# Patient Record
Sex: FEMALE | Race: WHITE | Employment: FULL TIME | URBAN - METROPOLITAN AREA
[De-identification: names, ages, dates, MRNs, and addresses within clinical notes are randomized per-mention and may not be internally consistent; named-entity substitution may affect disease eponyms.]

---

## 2018-01-29 ENCOUNTER — APPOINTMENT (OUTPATIENT)
Dept: GENERAL RADIOLOGY | Age: 43
DRG: 292 | End: 2018-01-29

## 2018-01-29 ENCOUNTER — HOSPITAL ENCOUNTER (INPATIENT)
Age: 43
LOS: 3 days | Discharge: HOME | DRG: 292 | End: 2018-02-01
Attending: EMERGENCY MEDICINE | Admitting: INTERNAL MEDICINE
Payer: MEDICAID

## 2018-01-29 DIAGNOSIS — R06.00 DYSPNEA, UNSPECIFIED TYPE: ICD-10-CM

## 2018-01-29 DIAGNOSIS — I50.31 ACUTE DIASTOLIC HEART FAILURE (HCC): ICD-10-CM

## 2018-01-29 DIAGNOSIS — A59.9 INFECTION DUE TO TRICHOMONAS: ICD-10-CM

## 2018-01-29 DIAGNOSIS — I50.20 SYSTOLIC CONGESTIVE HEART FAILURE, UNSPECIFIED CONGESTIVE HEART FAILURE CHRONICITY: Primary | ICD-10-CM

## 2018-01-29 PROBLEM — I10 ESSENTIAL HYPERTENSION: Status: ACTIVE | Noted: 2018-01-29

## 2018-01-29 PROBLEM — I50.9 ACUTE HEART FAILURE (HCC): Status: ACTIVE | Noted: 2018-01-29

## 2018-01-29 PROBLEM — A59.01 TRICHOMONAS VAGINALIS (TV) INFECTION: Status: ACTIVE | Noted: 2018-01-29

## 2018-01-29 PROBLEM — N39.0 UTI (URINARY TRACT INFECTION): Status: ACTIVE | Noted: 2018-01-29

## 2018-01-29 LAB
ALBUMIN SERPL-MCNC: 4 G/DL (ref 3.9–4.9)
ALP BLD-CCNC: 97 U/L (ref 40–130)
ALT SERPL-CCNC: 31 U/L (ref 0–33)
ANION GAP SERPL CALCULATED.3IONS-SCNC: 13 MEQ/L (ref 7–13)
AST SERPL-CCNC: 46 U/L (ref 0–35)
BACTERIA: ABNORMAL /HPF
BASOPHILS ABSOLUTE: 0.1 K/UL (ref 0–0.2)
BASOPHILS RELATIVE PERCENT: 1.3 %
BILIRUB SERPL-MCNC: 0.5 MG/DL (ref 0–1.2)
BILIRUBIN URINE: NEGATIVE
BLOOD, URINE: ABNORMAL
BUN BLDV-MCNC: 11 MG/DL (ref 6–20)
CALCIUM SERPL-MCNC: 9 MG/DL (ref 8.6–10.2)
CHLORIDE BLD-SCNC: 101 MEQ/L (ref 98–107)
CLARITY: ABNORMAL
CO2: 23 MEQ/L (ref 22–29)
COLOR: YELLOW
CREAT SERPL-MCNC: 0.92 MG/DL (ref 0.5–0.9)
EKG ATRIAL RATE: 107 BPM
EKG P AXIS: 73 DEGREES
EKG P-R INTERVAL: 136 MS
EKG Q-T INTERVAL: 350 MS
EKG QRS DURATION: 74 MS
EKG QTC CALCULATION (BAZETT): 467 MS
EKG R AXIS: 41 DEGREES
EKG T AXIS: 0 DEGREES
EKG VENTRICULAR RATE: 107 BPM
EOSINOPHILS ABSOLUTE: 0.1 K/UL (ref 0–0.7)
EOSINOPHILS RELATIVE PERCENT: 0.9 %
GFR AFRICAN AMERICAN: >60
GFR NON-AFRICAN AMERICAN: >60
GLOBULIN: 2.6 G/DL (ref 2.3–3.5)
GLUCOSE BLD-MCNC: 126 MG/DL (ref 74–109)
GLUCOSE URINE: NEGATIVE MG/DL
HCG QUALITATIVE: NEGATIVE
HCT VFR BLD CALC: 44 % (ref 37–47)
HEMOGLOBIN: 13.9 G/DL (ref 12–16)
KETONES, URINE: NEGATIVE MG/DL
LEUKOCYTE ESTERASE, URINE: ABNORMAL
LYMPHOCYTES ABSOLUTE: 1 K/UL (ref 1–4.8)
LYMPHOCYTES RELATIVE PERCENT: 15.3 %
MAGNESIUM: 2 MG/DL (ref 1.7–2.3)
MCH RBC QN AUTO: 28.2 PG (ref 27–31.3)
MCHC RBC AUTO-ENTMCNC: 31.6 % (ref 33–37)
MCV RBC AUTO: 89.2 FL (ref 82–100)
MONOCYTES ABSOLUTE: 0.6 K/UL (ref 0.2–0.8)
MONOCYTES RELATIVE PERCENT: 9.5 %
NEUTROPHILS ABSOLUTE: 4.9 K/UL (ref 1.4–6.5)
NEUTROPHILS RELATIVE PERCENT: 73 %
NITRITE, URINE: POSITIVE
PDW BLD-RTO: 15.5 % (ref 11.5–14.5)
PH UA: 7.5 (ref 5–9)
PLATELET # BLD: 270 K/UL (ref 130–400)
POTASSIUM SERPL-SCNC: 4.4 MEQ/L (ref 3.5–5.1)
PRO-BNP: 1388 PG/ML
PROTEIN UA: ABNORMAL MG/DL
RBC # BLD: 4.93 M/UL (ref 4.2–5.4)
RBC UA: ABNORMAL /HPF (ref 0–2)
SODIUM BLD-SCNC: 137 MEQ/L (ref 132–144)
SPECIFIC GRAVITY UA: 1.01 (ref 1–1.03)
TOTAL PROTEIN: 6.6 G/DL (ref 6.4–8.1)
TRICHOMONAS: PRESENT /HPF
TROPONIN: <0.01 NG/ML (ref 0–0.01)
TSH SERPL DL<=0.05 MIU/L-ACNC: 1.95 UIU/ML (ref 0.27–4.2)
TSH SERPL DL<=0.05 MIU/L-ACNC: 2.36 UIU/ML (ref 0.27–4.2)
URINE REFLEX TO CULTURE: YES
UROBILINOGEN, URINE: 0.2 E.U./DL
WBC # BLD: 6.7 K/UL (ref 4.8–10.8)
WBC UA: ABNORMAL /HPF (ref 0–5)

## 2018-01-29 PROCEDURE — 84484 ASSAY OF TROPONIN QUANT: CPT

## 2018-01-29 PROCEDURE — 36415 COLL VENOUS BLD VENIPUNCTURE: CPT

## 2018-01-29 PROCEDURE — 93005 ELECTROCARDIOGRAM TRACING: CPT

## 2018-01-29 PROCEDURE — 83735 ASSAY OF MAGNESIUM: CPT

## 2018-01-29 PROCEDURE — 2060000000 HC ICU INTERMEDIATE R&B

## 2018-01-29 PROCEDURE — 71046 X-RAY EXAM CHEST 2 VIEWS: CPT

## 2018-01-29 PROCEDURE — 84443 ASSAY THYROID STIM HORMONE: CPT

## 2018-01-29 PROCEDURE — 87077 CULTURE AEROBIC IDENTIFY: CPT

## 2018-01-29 PROCEDURE — 6360000002 HC RX W HCPCS: Performed by: EMERGENCY MEDICINE

## 2018-01-29 PROCEDURE — 99285 EMERGENCY DEPT VISIT HI MDM: CPT

## 2018-01-29 PROCEDURE — 6360000002 HC RX W HCPCS: Performed by: INTERNAL MEDICINE

## 2018-01-29 PROCEDURE — 96374 THER/PROPH/DIAG INJ IV PUSH: CPT

## 2018-01-29 PROCEDURE — 83880 ASSAY OF NATRIURETIC PEPTIDE: CPT

## 2018-01-29 PROCEDURE — 6370000000 HC RX 637 (ALT 250 FOR IP): Performed by: EMERGENCY MEDICINE

## 2018-01-29 PROCEDURE — 80053 COMPREHEN METABOLIC PANEL: CPT

## 2018-01-29 PROCEDURE — 6370000000 HC RX 637 (ALT 250 FOR IP): Performed by: INTERNAL MEDICINE

## 2018-01-29 PROCEDURE — 87186 SC STD MICRODIL/AGAR DIL: CPT

## 2018-01-29 PROCEDURE — 85025 COMPLETE CBC W/AUTO DIFF WBC: CPT

## 2018-01-29 PROCEDURE — 81001 URINALYSIS AUTO W/SCOPE: CPT

## 2018-01-29 PROCEDURE — 2580000003 HC RX 258: Performed by: EMERGENCY MEDICINE

## 2018-01-29 PROCEDURE — 87086 URINE CULTURE/COLONY COUNT: CPT

## 2018-01-29 PROCEDURE — 84703 CHORIONIC GONADOTROPIN ASSAY: CPT

## 2018-01-29 RX ORDER — SODIUM CHLORIDE 0.9 % (FLUSH) 0.9 %
10 SYRINGE (ML) INJECTION EVERY 12 HOURS SCHEDULED
Status: DISCONTINUED | OUTPATIENT
Start: 2018-01-29 | End: 2018-02-01 | Stop reason: HOSPADM

## 2018-01-29 RX ORDER — CARVEDILOL 3.12 MG/1
3.12 TABLET ORAL 2 TIMES DAILY WITH MEALS
Status: DISCONTINUED | OUTPATIENT
Start: 2018-01-29 | End: 2018-01-30

## 2018-01-29 RX ORDER — METRONIDAZOLE 500 MG/1
2000 TABLET ORAL ONCE
Status: COMPLETED | OUTPATIENT
Start: 2018-01-29 | End: 2018-01-29

## 2018-01-29 RX ORDER — FUROSEMIDE 10 MG/ML
80 INJECTION INTRAMUSCULAR; INTRAVENOUS ONCE
Status: COMPLETED | OUTPATIENT
Start: 2018-01-29 | End: 2018-01-29

## 2018-01-29 RX ORDER — ACETAMINOPHEN 325 MG/1
650 TABLET ORAL EVERY 4 HOURS PRN
Status: DISCONTINUED | OUTPATIENT
Start: 2018-01-29 | End: 2018-02-01 | Stop reason: HOSPADM

## 2018-01-29 RX ORDER — NICOTINE 21 MG/24HR
1 PATCH, TRANSDERMAL 24 HOURS TRANSDERMAL DAILY
Status: DISCONTINUED | OUTPATIENT
Start: 2018-01-29 | End: 2018-02-01 | Stop reason: HOSPADM

## 2018-01-29 RX ORDER — M-VIT,TX,IRON,MINS/CALC/FOLIC 27MG-0.4MG
1 TABLET ORAL DAILY
COMMUNITY
End: 2021-02-24

## 2018-01-29 RX ORDER — LISINOPRIL 20 MG/1
20 TABLET ORAL DAILY
Status: DISCONTINUED | OUTPATIENT
Start: 2018-01-29 | End: 2018-02-01 | Stop reason: HOSPADM

## 2018-01-29 RX ORDER — SODIUM CHLORIDE 0.9 % (FLUSH) 0.9 %
10 SYRINGE (ML) INJECTION PRN
Status: DISCONTINUED | OUTPATIENT
Start: 2018-01-29 | End: 2018-02-01 | Stop reason: HOSPADM

## 2018-01-29 RX ORDER — ONDANSETRON 2 MG/ML
4 INJECTION INTRAMUSCULAR; INTRAVENOUS EVERY 6 HOURS PRN
Status: DISCONTINUED | OUTPATIENT
Start: 2018-01-29 | End: 2018-02-01 | Stop reason: HOSPADM

## 2018-01-29 RX ADMIN — FUROSEMIDE 80 MG: 10 INJECTION, SOLUTION INTRAMUSCULAR; INTRAVENOUS at 11:30

## 2018-01-29 RX ADMIN — ENOXAPARIN SODIUM 40 MG: 40 INJECTION SUBCUTANEOUS at 17:50

## 2018-01-29 RX ADMIN — FUROSEMIDE 1 MG/HR: 10 INJECTION, SOLUTION INTRAVENOUS at 13:15

## 2018-01-29 RX ADMIN — METRONIDAZOLE 2000 MG: 500 TABLET ORAL at 16:28

## 2018-01-29 RX ADMIN — LISINOPRIL 20 MG: 20 TABLET ORAL at 17:50

## 2018-01-29 RX ADMIN — CARVEDILOL 3.12 MG: 3.12 TABLET, FILM COATED ORAL at 17:50

## 2018-01-29 RX ADMIN — ACETAMINOPHEN 650 MG: 325 TABLET, FILM COATED ORAL at 23:18

## 2018-01-29 RX ADMIN — METRONIDAZOLE 2000 MG: 500 TABLET ORAL at 13:25

## 2018-01-29 ASSESSMENT — PAIN SCALES - GENERAL
PAINLEVEL_OUTOF10: 6
PAINLEVEL_OUTOF10: 0
PAINLEVEL_OUTOF10: 6

## 2018-01-29 ASSESSMENT — ENCOUNTER SYMPTOMS
ABDOMINAL PAIN: 0
SHORTNESS OF BREATH: 1
CHEST TIGHTNESS: 0
ABDOMINAL DISTENTION: 1
COUGH: 0
VOMITING: 0
NAUSEA: 0
SORE THROAT: 0
EYE PAIN: 0

## 2018-01-29 ASSESSMENT — PAIN DESCRIPTION - ORIENTATION: ORIENTATION: LEFT;RIGHT

## 2018-01-29 ASSESSMENT — PAIN DESCRIPTION - LOCATION: LOCATION: LEG

## 2018-01-29 ASSESSMENT — PAIN DESCRIPTION - DESCRIPTORS: DESCRIPTORS: ACHING

## 2018-01-29 ASSESSMENT — PAIN DESCRIPTION - PAIN TYPE: TYPE: ACUTE PAIN

## 2018-01-29 ASSESSMENT — PAIN DESCRIPTION - FREQUENCY: FREQUENCY: CONTINUOUS

## 2018-01-29 NOTE — H&P
Department of Internal Medicine   History and Physical      CHIEF COMPLAINT:  Shortness of breath    Reason for Admission:  Acute heart failure    History Obtained From:  patient    HISTORY OF PRESENT ILLNESS:      The patient is a 43 y.o. female with no significant past medical history of who presents with shortness of breath and lower extremity swelling that started 1 month ago. As per the patient she was able to perform all ehr ADLs without getting short of breath. Lately she has been complaining about orthopnea and paroxysmal dyspnea. She has also gained significant amount of weight in past month and has been feeling abdominal fullness. As per the patient she was diagnosed with post partum cardiomyopathy 8 years ago after her second child was born. She was seen by cardiologist and was on few cardiac medications. She was then asked to discontinue them as she was doing fine. She was also diagnosed with gestational diabetes mellitus during both of her pregnancies. She has positive family history of CAD in her father and he  of MI at age 52  She is a current smoker and drinks alcohol on regular basis  She also has history of cocaine use few weeks ago    She denies any chest pain, shortness of breath, abdominal pain, nausea, vomiting, diarrhea, headache, dizziness or lightheadedness. Past Medical History:        Diagnosis Date    CHF (congestive heart failure) ([facility] [address]      Past Surgical History:    History reviewed. No pertinent surgical history. Immunizations:                Influenza: Indicated for current flu vaccination season Oct. to Feb.            Pneumococcal Polysaccharide:  Not indicated    Medications Prior to Admission:    Prescriptions Prior to Admission: Multiple Vitamins-Minerals (THERAPEUTIC MULTIVITAMIN-MINERALS) tablet, Take 1 tablet by mouth daily    Allergies:  Review of patient's allergies indicates no known allergies.     Social History:   Social History     Tobacco History

## 2018-01-29 NOTE — ED PROVIDER NOTES
[address] [facility] ED  eMERGENCY dEPARTMENT eNCOUnter      Pt Name: Huang Jenkins  MRN: [id number]  [place] 1975  Date of evaluation: 1/29/2018  Provider: [facility]       Chief Complaint   Patient presents with    Shortness of Breath     pt c/o SOB, BLE edema, and abdominal swelling for the past month         HISTORY OF PRESENT ILLNESS   (Location/Symptom, Timing/Onset, Context/Setting, Quality, Duration, Modifying Factors, Severity)  Note limiting factors. Huang Jenkins is a 43 y.o. female who presents to the emergency department . Patient presents with increasing shortness of breath peripheral edema and abdominal swelling over a month. .  She has not been able to sleep in her bed for a month. She is orthopneic short of breath and dyspneic with any exertion. She is having difficulty putting on her shoes and wearing her own close. He has not had any chest pain. Patient admits that she had congestive heart failure after her pregnancy. She states that she got better and never had a problem with it. She does not have a primary care doctor nor a cardiologist.  She is not taking any medications. HPI    Nursing Notes were reviewed. REVIEW OF SYSTEMS    (2-9 systems for level 4, 10 or more for level 5)     Review of Systems   Constitutional: Positive for activity change. Negative for appetite change, fatigue and fever. HENT: Negative for congestion and sore throat. Eyes: Negative for pain and visual disturbance. Respiratory: Positive for shortness of breath. Negative for cough and chest tightness. Orthopnea   Cardiovascular: Positive for leg swelling. Negative for chest pain and palpitations. Gastrointestinal: Positive for abdominal distention. Negative for abdominal pain, nausea and vomiting. Endocrine: Negative for polydipsia. Genitourinary: Negative for flank pain and urgency. Musculoskeletal: Negative for gait problem and neck stiffness. deviation present. No thyromegaly present. Cardiovascular: Normal rate and normal heart sounds. No murmur heard. Pulmonary/Chest: Effort normal. No respiratory distress. She has no wheezes. She has rales. Abdominal: Soft. Bowel sounds are normal. She exhibits distension. There is no tenderness. There is no guarding. Musculoskeletal: Normal range of motion. She exhibits edema. Jose Mallory from her toes all away to the top of her abdomen   Neurological: She is alert and oriented to person, place, and time. No cranial nerve deficit. Skin: Skin is warm and dry. No rash noted. She is not diaphoretic. Psychiatric: She has a normal mood and affect. Her behavior is normal.       DIAGNOSTIC RESULTS     EKG: All EKG's are interpreted by the Emergency Department Physician who either signs or Co-signs this chart in the absence of a cardiologist.    Sinus tachycardia 107 bpm.  Left atrial enlargement. Poor R-wave progression anteriorly.     RADIOLOGY:   Non-plain film images such as CT, Ultrasound and MRI are read by the radiologist. Plain radiographic images are visualized and preliminarily interpreted by the emergency physician with the below findings:    Chest x-ray shows cardiomegaly with CHF    Interpretation per the Radiologist below, if available at the time of this note:    XR CHEST STANDARD (2 VW)    (Results Pending)         ED BEDSIDE ULTRASOUND:   Performed by ED Physician - none    LABS:  Labs Reviewed   CBC WITH AUTO DIFFERENTIAL - Abnormal; Notable for the following:        Result Value    MCHC 31.6 (*)     RDW 15.5 (*)     All other components within normal limits   COMPREHENSIVE METABOLIC PANEL - Abnormal; Notable for the following:     Glucose 126 (*)     CREATININE 0.92 (*)     AST 46 (*)     All other components within normal limits   URINE RT REFLEX TO CULTURE - Abnormal; Notable for the following:     Clarity, UA CLOUDY (*)     Blood, Urine LARGE (*)     Protein, UA TRACE (*)     Nitrite, Urine POSITIVE (*)     Leukocyte Esterase, Urine MODERATE (*)     All other components within normal limits   URINE CULTURE   TROPONIN   BRAIN NATRIURETIC PEPTIDE   MAGNESIUM   TSH WITHOUT REFLEX   MICROSCOPIC URINALYSIS       All other labs were within normal range or not returned as of this dictation. EMERGENCY DEPARTMENT COURSE and DIFFERENTIAL DIAGNOSIS/MDM:   Vitals:    Vitals:    01/29/18 1022   BP: (!) 160/96   Pulse: 120   Resp: (!) 32   Temp: 98.7 °F (37.1 °C)   TempSrc: Oral   SpO2: 98%   Weight: 275 lb (124.7 kg)   Height: 5' 4\" (1.626 m)       Patient presents in congestive heart failure. She is retaining large amounts of fluid and is edematous from her toes to her chest.  Patient is dyspneic with any exertion. Patient may have a cardiomyopathy. Patient was started on IV Lasix and a Lasix drip. Patient will be admitted for further evaluation and treatment. MDM      REASSESSMENT     ED Course          CRITICAL CARE TIME   Total Critical Care time was 30 minutes, excluding separately reportable procedures. There was a high probability of clinically significant/life threatening deterioration in the patient's condition which required my urgent intervention. CONSULTS:  None    PROCEDURES:  Unless otherwise noted below, none     Procedures    FINAL IMPRESSION      1. Systolic congestive heart failure, unspecified congestive heart failure chronicity ([address]    2. Dyspnea, unspecified type          DISPOSITION/PLAN   DISPOSITION Admitted 01/29/2018 12:15:31 PM      PATIENT REFERRED TO:  No follow-up provider specified.     DISCHARGE MEDICATIONS:  New Prescriptions    No medications on file          (Please note that portions of this note were completed with a voice recognition program.  Efforts were made to edit the dictations but occasionally words are mis-transcribed.)    Kim Robbins DO (electronically signed)  Attending Emergency Physician          Kim Robbins DO  01/29/18 [id number]       Gerri Lino

## 2018-01-30 PROBLEM — E66.01 MORBID OBESITY WITH BMI OF 50.0-59.9, ADULT (HCC): Status: ACTIVE | Noted: 2018-01-30

## 2018-01-30 PROBLEM — F17.200 SMOKER: Status: ACTIVE | Noted: 2018-01-30

## 2018-01-30 LAB
ANION GAP SERPL CALCULATED.3IONS-SCNC: 16 MEQ/L (ref 7–13)
BASOPHILS ABSOLUTE: 0.1 K/UL (ref 0–0.2)
BASOPHILS RELATIVE PERCENT: 1 %
BUN BLDV-MCNC: 10 MG/DL (ref 6–20)
CALCIUM SERPL-MCNC: 9.4 MG/DL (ref 8.6–10.2)
CHLORIDE BLD-SCNC: 97 MEQ/L (ref 98–107)
CHOLESTEROL, TOTAL: 181 MG/DL (ref 0–199)
CO2: 27 MEQ/L (ref 22–29)
CREAT SERPL-MCNC: 0.93 MG/DL (ref 0.5–0.9)
EOSINOPHILS ABSOLUTE: 0.1 K/UL (ref 0–0.7)
EOSINOPHILS RELATIVE PERCENT: 2.1 %
GFR AFRICAN AMERICAN: >60
GFR NON-AFRICAN AMERICAN: >60
GLUCOSE BLD-MCNC: 133 MG/DL (ref 74–109)
HCT VFR BLD CALC: 43.5 % (ref 37–47)
HDLC SERPL-MCNC: 32 MG/DL (ref 40–59)
HEMOGLOBIN: 13.9 G/DL (ref 12–16)
LDL CHOLESTEROL CALCULATED: 131 MG/DL (ref 0–129)
LV EF: 50 %
LVEF MODALITY: NORMAL
LYMPHOCYTES ABSOLUTE: 1.1 K/UL (ref 1–4.8)
LYMPHOCYTES RELATIVE PERCENT: 16.6 %
MAGNESIUM: 1.9 MG/DL (ref 1.7–2.3)
MCH RBC QN AUTO: 28.3 PG (ref 27–31.3)
MCHC RBC AUTO-ENTMCNC: 31.9 % (ref 33–37)
MCV RBC AUTO: 88.7 FL (ref 82–100)
MONOCYTES ABSOLUTE: 0.7 K/UL (ref 0.2–0.8)
MONOCYTES RELATIVE PERCENT: 10.9 %
NEUTROPHILS ABSOLUTE: 4.4 K/UL (ref 1.4–6.5)
NEUTROPHILS RELATIVE PERCENT: 69.4 %
PDW BLD-RTO: 15.2 % (ref 11.5–14.5)
PLATELET # BLD: 292 K/UL (ref 130–400)
POTASSIUM SERPL-SCNC: 3.7 MEQ/L (ref 3.5–5.1)
PRO-BNP: 1101 PG/ML
RBC # BLD: 4.9 M/UL (ref 4.2–5.4)
SODIUM BLD-SCNC: 140 MEQ/L (ref 132–144)
TRIGL SERPL-MCNC: 92 MG/DL (ref 0–200)
WBC # BLD: 6.4 K/UL (ref 4.8–10.8)

## 2018-01-30 PROCEDURE — 6360000002 HC RX W HCPCS: Performed by: INTERNAL MEDICINE

## 2018-01-30 PROCEDURE — 2580000003 HC RX 258: Performed by: INTERNAL MEDICINE

## 2018-01-30 PROCEDURE — 36415 COLL VENOUS BLD VENIPUNCTURE: CPT

## 2018-01-30 PROCEDURE — 6370000000 HC RX 637 (ALT 250 FOR IP): Performed by: INTERNAL MEDICINE

## 2018-01-30 PROCEDURE — 2060000000 HC ICU INTERMEDIATE R&B

## 2018-01-30 PROCEDURE — 6360000004 HC RX CONTRAST MEDICATION: Performed by: INTERNAL MEDICINE

## 2018-01-30 PROCEDURE — 83880 ASSAY OF NATRIURETIC PEPTIDE: CPT

## 2018-01-30 PROCEDURE — 80048 BASIC METABOLIC PNL TOTAL CA: CPT

## 2018-01-30 PROCEDURE — 85025 COMPLETE CBC W/AUTO DIFF WBC: CPT

## 2018-01-30 PROCEDURE — C8929 TTE W OR WO FOL WCON,DOPPLER: HCPCS

## 2018-01-30 PROCEDURE — 83735 ASSAY OF MAGNESIUM: CPT

## 2018-01-30 PROCEDURE — 99223 1ST HOSP IP/OBS HIGH 75: CPT | Performed by: INTERNAL MEDICINE

## 2018-01-30 PROCEDURE — 80061 LIPID PANEL: CPT

## 2018-01-30 RX ORDER — POTASSIUM CHLORIDE 20 MEQ/1
20 TABLET, EXTENDED RELEASE ORAL 2 TIMES DAILY WITH MEALS
Status: DISCONTINUED | OUTPATIENT
Start: 2018-01-30 | End: 2018-01-31

## 2018-01-30 RX ORDER — CIPROFLOXACIN 500 MG/1
500 TABLET, FILM COATED ORAL EVERY 12 HOURS SCHEDULED
Status: DISCONTINUED | OUTPATIENT
Start: 2018-01-30 | End: 2018-01-31

## 2018-01-30 RX ORDER — CARVEDILOL 12.5 MG/1
12.5 TABLET ORAL 2 TIMES DAILY WITH MEALS
Status: DISCONTINUED | OUTPATIENT
Start: 2018-01-30 | End: 2018-01-31

## 2018-01-30 RX ADMIN — CIPROFLOXACIN HYDROCHLORIDE 500 MG: 500 TABLET, FILM COATED ORAL at 22:39

## 2018-01-30 RX ADMIN — CARVEDILOL 3.12 MG: 3.12 TABLET, FILM COATED ORAL at 08:28

## 2018-01-30 RX ADMIN — FUROSEMIDE 10 MG/HR: 10 INJECTION, SOLUTION INTRAVENOUS at 22:58

## 2018-01-30 RX ADMIN — POTASSIUM CHLORIDE 20 MEQ: 1500 TABLET, EXTENDED RELEASE ORAL at 16:16

## 2018-01-30 RX ADMIN — PERFLUTREN 1.65 MG: 6.52 INJECTION, SUSPENSION INTRAVENOUS at 10:50

## 2018-01-30 RX ADMIN — ACETAMINOPHEN 650 MG: 325 TABLET, FILM COATED ORAL at 08:27

## 2018-01-30 RX ADMIN — LISINOPRIL 20 MG: 20 TABLET ORAL at 08:28

## 2018-01-30 RX ADMIN — FUROSEMIDE 10 MG/HR: 10 INJECTION, SOLUTION INTRAVENOUS at 09:19

## 2018-01-30 RX ADMIN — CARVEDILOL 12.5 MG: 12.5 TABLET, FILM COATED ORAL at 16:16

## 2018-01-30 RX ADMIN — ACETAMINOPHEN 650 MG: 325 TABLET, FILM COATED ORAL at 15:51

## 2018-01-30 RX ADMIN — POTASSIUM CHLORIDE 20 MEQ: 1500 TABLET, EXTENDED RELEASE ORAL at 09:19

## 2018-01-30 RX ADMIN — ENOXAPARIN SODIUM 40 MG: 40 INJECTION SUBCUTANEOUS at 08:30

## 2018-01-30 RX ADMIN — CIPROFLOXACIN HYDROCHLORIDE 500 MG: 500 TABLET, FILM COATED ORAL at 12:17

## 2018-01-30 ASSESSMENT — PAIN DESCRIPTION - DESCRIPTORS
DESCRIPTORS: TIGHTNESS
DESCRIPTORS: TIGHTNESS

## 2018-01-30 ASSESSMENT — PAIN DESCRIPTION - ORIENTATION
ORIENTATION: LEFT;RIGHT
ORIENTATION: RIGHT;LEFT

## 2018-01-30 ASSESSMENT — ENCOUNTER SYMPTOMS
BLOOD IN STOOL: 0
SHORTNESS OF BREATH: 1
WHEEZING: 0
STRIDOR: 0
COUGH: 0
ABDOMINAL DISTENTION: 1
NAUSEA: 0
CHEST TIGHTNESS: 0
EYES NEGATIVE: 1

## 2018-01-30 ASSESSMENT — PAIN DESCRIPTION - PAIN TYPE
TYPE: ACUTE PAIN
TYPE: ACUTE PAIN

## 2018-01-30 ASSESSMENT — PAIN DESCRIPTION - LOCATION
LOCATION: LEG
LOCATION: LEG

## 2018-01-30 ASSESSMENT — PAIN SCALES - GENERAL
PAINLEVEL_OUTOF10: 6
PAINLEVEL_OUTOF10: 2
PAINLEVEL_OUTOF10: 6
PAINLEVEL_OUTOF10: 2
PAINLEVEL_OUTOF10: 2
PAINLEVEL_OUTOF10: 5

## 2018-01-30 ASSESSMENT — PAIN DESCRIPTION - FREQUENCY: FREQUENCY: CONTINUOUS

## 2018-01-30 NOTE — CONSULTS
She has no wheezes. She has bibasilar rales. She exhibits no tenderness. Abdominal: Soft. Bowel sounds are normal. There is no tenderness. Musculoskeletal: Normal range of motion. She exhibits edema (1-2+). She exhibits no tenderness. Neurological: She is alert and oriented to person, place, and time. Skin: Skin is warm. No cyanosis. Nails show no clubbing. Results/ Medications reviewed 1/30/2018, 9:02 AM     Laboratory, Microbiology, Pathology, Radiology, Cardiology, Medications and Transcriptions reviewed  Scheduled Meds:   carvedilol  12.5 mg Oral BID WC    potassium chloride  20 mEq Oral BID WC    sodium chloride flush  10 mL Intravenous 2 times per day    enoxaparin  40 mg Subcutaneous Daily    nicotine  1 patch Transdermal Daily    lisinopril  20 mg Oral Daily     Continuous Infusions:   furosemide (LASIX) 1mg/ml infusion 5 mg/hr (01/29/18 1631)       Recent Labs      01/29/18   1115  01/30/18   0633   WBC  6.7  6.4   HGB  13.9  13.9   HCT  44.0  43.5   MCV  89.2  88.7   PLT  270  292     Recent Labs      01/29/18   1115  01/30/18   0633   NA  137  140   K  4.4  3.7   CL  101  97*   CO2  23  27   BUN  11  10   CREATININE  0.92*  0.93*     Recent Labs      01/29/18   1115   AST  46*   ALT  31   BILITOT  0.5   ALKPHOS  97     No results for input(s): LIPASE, AMYLASE in the last 72 hours. Recent Labs      01/29/18   1115   PROT  6.6     Xr Chest Standard (2 Vw)    Result Date: 1/29/2018  EXAMINATION: XR CHEST (2 VW), 1/29/2018 11:15 AM History: 55-year-old female, shortness of breath COMPARISON:  October 14, 2009 FINDINGS:  Studies limited by patient body habitus. There is increased lung markings in the right lung base. The costophrenic angles are clear. Cardiomegaly. There are peripheral Kerley B lines, most noted on the left. Limited visualization of the spine. Monitoring leads project across the thorax.      1. Increased lung markings in the right lung base, most likely represents

## 2018-01-30 NOTE — PROGRESS NOTES
Nutrition Education    Type and Reason for Visit: Consult, Patient Education (CHF education per protocol)    CTN met with pt and provided information regarding sodium restrictions. Pt denies questions or follow up needs at this time. · Contact name and number provided.     Electronically signed by Emerald Jerez RD, LD on 1/30/18 at 4:48 PM

## 2018-01-31 ENCOUNTER — APPOINTMENT (OUTPATIENT)
Dept: ULTRASOUND IMAGING | Age: 43
DRG: 292 | End: 2018-01-31

## 2018-01-31 LAB
ANION GAP SERPL CALCULATED.3IONS-SCNC: 20 MEQ/L (ref 7–13)
BUN BLDV-MCNC: 13 MG/DL (ref 6–20)
CALCIUM SERPL-MCNC: 9.6 MG/DL (ref 8.6–10.2)
CHLORIDE BLD-SCNC: 94 MEQ/L (ref 98–107)
CO2: 27 MEQ/L (ref 22–29)
CREAT SERPL-MCNC: 0.98 MG/DL (ref 0.5–0.9)
GFR AFRICAN AMERICAN: >60
GFR NON-AFRICAN AMERICAN: >60
GLUCOSE BLD-MCNC: 151 MG/DL (ref 74–109)
HBA1C MFR BLD: 6.9 % (ref 4.8–5.9)
MAGNESIUM: 1.9 MG/DL (ref 1.7–2.3)
ORGANISM: ABNORMAL
POTASSIUM SERPL-SCNC: 3.7 MEQ/L (ref 3.5–5.1)
SODIUM BLD-SCNC: 141 MEQ/L (ref 132–144)
URINE CULTURE, ROUTINE: ABNORMAL
URINE CULTURE, ROUTINE: ABNORMAL

## 2018-01-31 PROCEDURE — 2060000000 HC ICU INTERMEDIATE R&B

## 2018-01-31 PROCEDURE — 36415 COLL VENOUS BLD VENIPUNCTURE: CPT

## 2018-01-31 PROCEDURE — 94762 N-INVAS EAR/PLS OXIMTRY CONT: CPT

## 2018-01-31 PROCEDURE — 6360000002 HC RX W HCPCS: Performed by: INTERNAL MEDICINE

## 2018-01-31 PROCEDURE — 83735 ASSAY OF MAGNESIUM: CPT

## 2018-01-31 PROCEDURE — 83036 HEMOGLOBIN GLYCOSYLATED A1C: CPT

## 2018-01-31 PROCEDURE — 6370000000 HC RX 637 (ALT 250 FOR IP): Performed by: INTERNAL MEDICINE

## 2018-01-31 PROCEDURE — 93971 EXTREMITY STUDY: CPT

## 2018-01-31 PROCEDURE — 2580000003 HC RX 258: Performed by: INTERNAL MEDICINE

## 2018-01-31 PROCEDURE — 99233 SBSQ HOSP IP/OBS HIGH 50: CPT | Performed by: INTERNAL MEDICINE

## 2018-01-31 PROCEDURE — 80048 BASIC METABOLIC PNL TOTAL CA: CPT

## 2018-01-31 RX ORDER — DIPHENHYDRAMINE HCL 25 MG
25 TABLET ORAL EVERY 6 HOURS PRN
Status: DISCONTINUED | OUTPATIENT
Start: 2018-01-31 | End: 2018-02-01 | Stop reason: HOSPADM

## 2018-01-31 RX ORDER — CARVEDILOL 25 MG/1
25 TABLET ORAL 2 TIMES DAILY WITH MEALS
Status: DISCONTINUED | OUTPATIENT
Start: 2018-01-31 | End: 2018-01-31

## 2018-01-31 RX ORDER — CEPHALEXIN 500 MG/1
500 CAPSULE ORAL EVERY 12 HOURS SCHEDULED
Status: DISCONTINUED | OUTPATIENT
Start: 2018-01-31 | End: 2018-02-01 | Stop reason: HOSPADM

## 2018-01-31 RX ORDER — POTASSIUM CHLORIDE 20 MEQ/1
40 TABLET, EXTENDED RELEASE ORAL 2 TIMES DAILY WITH MEALS
Status: DISCONTINUED | OUTPATIENT
Start: 2018-01-31 | End: 2018-02-01 | Stop reason: HOSPADM

## 2018-01-31 RX ORDER — METOLAZONE 2.5 MG/1
5 TABLET ORAL DAILY
Status: COMPLETED | OUTPATIENT
Start: 2018-01-31 | End: 2018-01-31

## 2018-01-31 RX ORDER — CARVEDILOL 25 MG/1
25 TABLET ORAL 2 TIMES DAILY WITH MEALS
Status: DISCONTINUED | OUTPATIENT
Start: 2018-01-31 | End: 2018-02-01 | Stop reason: HOSPADM

## 2018-01-31 RX ORDER — POTASSIUM CHLORIDE 20 MEQ/1
40 TABLET, EXTENDED RELEASE ORAL 2 TIMES DAILY WITH MEALS
Status: DISCONTINUED | OUTPATIENT
Start: 2018-01-31 | End: 2018-01-31

## 2018-01-31 RX ADMIN — DIPHENHYDRAMINE HCL 25 MG: 25 TABLET ORAL at 08:33

## 2018-01-31 RX ADMIN — ENOXAPARIN SODIUM 40 MG: 40 INJECTION SUBCUTANEOUS at 08:33

## 2018-01-31 RX ADMIN — FUROSEMIDE 10 MG/HR: 10 INJECTION, SOLUTION INTRAVENOUS at 08:33

## 2018-01-31 RX ADMIN — CARVEDILOL 25 MG: 25 TABLET, FILM COATED ORAL at 09:10

## 2018-01-31 RX ADMIN — POTASSIUM CHLORIDE 40 MEQ: 1500 TABLET, EXTENDED RELEASE ORAL at 17:45

## 2018-01-31 RX ADMIN — POTASSIUM CHLORIDE 40 MEQ: 1500 TABLET, EXTENDED RELEASE ORAL at 09:10

## 2018-01-31 RX ADMIN — CEPHALEXIN 500 MG: 500 CAPSULE ORAL at 20:33

## 2018-01-31 RX ADMIN — CARVEDILOL 25 MG: 25 TABLET, FILM COATED ORAL at 17:45

## 2018-01-31 RX ADMIN — ACETAMINOPHEN 650 MG: 325 TABLET, FILM COATED ORAL at 08:33

## 2018-01-31 RX ADMIN — LISINOPRIL 20 MG: 20 TABLET ORAL at 08:33

## 2018-01-31 RX ADMIN — CEPHALEXIN 500 MG: 500 CAPSULE ORAL at 14:03

## 2018-01-31 RX ADMIN — METOLAZONE 5 MG: 2.5 TABLET ORAL at 08:33

## 2018-01-31 ASSESSMENT — ENCOUNTER SYMPTOMS
NAUSEA: 0
EYES NEGATIVE: 1
STRIDOR: 0
SHORTNESS OF BREATH: 1
BLOOD IN STOOL: 0
COUGH: 0
WHEEZING: 0
GASTROINTESTINAL NEGATIVE: 1
CHEST TIGHTNESS: 0

## 2018-01-31 ASSESSMENT — PAIN SCALES - GENERAL
PAINLEVEL_OUTOF10: 0
PAINLEVEL_OUTOF10: 0
PAINLEVEL_OUTOF10: 6
PAINLEVEL_OUTOF10: 0
PAINLEVEL_OUTOF10: 2
PAINLEVEL_OUTOF10: 2
PAINLEVEL_OUTOF10: 0

## 2018-01-31 NOTE — PROGRESS NOTES
atraumatic, sinuses nontender on palpation, external ears without lesions, oral pharynx with moist mucus membranes, tonsils without erythema or exudates, gums normal and good dentition. NECK:  Supple, symmetrical, trachea midline, no adenopathy, thyroid symmetric, not enlarged and no tenderness, skin normal  LUNGS:  Mild respiratory distress, good air exchange and crackles diffuse  CARDIOVASCULAR:  Normal apical impulse, regular rate and rhythm, normal S1 and S2, no S3 or S4, and no murmur noted  ABDOMEN:  No scars, normal bowel sounds, soft, non-distended, non-tender, no masses palpated, no hepatosplenomegally  NEUROLOGIC:  Awake, alert, oriented to name, place and time.  Cranial nerves II-XII are grossly intact.  Motor is 5 out of 5 bilaterally.    + 2 pitting edema bilaterally  Data    LABS  Recent Labs      01/29/18   1115  01/30/18   0633   WBC  6.7  6.4   RBC  4.93  4.90   HGB  13.9  13.9   HCT  44.0  43.5   MCV  89.2  88.7   MCH  28.2  28.3   MCHC  31.6*  31.9*   RDW  15.5*  15.2*   PLT  270  292       Recent Labs      01/29/18   1115 01/30/18   0633  01/31/18   0627   NA  137  140  141   K  4.4  3.7  3.7   CL  101  97*  94*   CO2  23  27  27   BUN  11  10  13   CREATININE  0.92*  0.93*  0.98*   GLUCOSE  126*  133*  151*   CALCIUM  9.0  9.4  9.6       Recent Labs      01/29/18   1115 01/30/18   0633  01/31/18   0627   MG  2.0  1.9  1.9         ASSESSMENT AND PLAN      Active Hospital Problems    Diagnosis Date Noted    Morbid obesity with BMI of 50.0-59.9, adult ([facility] [address] [E66.01, Z68.43] 01/30/2018    Smoker [F17.200] 01/30/2018    Acute heart failure (HCC) [I50.9] 01/29/2018    Trichomonas vaginalis (TV) infection [A59.01] 01/29/2018    Essential hypertension [I10] 01/29/2018    UTI (urinary tract infection) [N39.0] 01/29/2018     - Continue lasix drip. Patinet is -13 L today. - HTN: controlled on coreg and Lisinopril  - UTI : started on Keflex. Patient does not have allergy to cipro.  Switched to

## 2018-01-31 NOTE — PROGRESS NOTES
GFRAA >60.0 01/31/2018    LABGLOM >60.0 01/31/2018    GLUCOSE 151 01/31/2018    PROT 6.6 01/29/2018    LABALBU 4.0 01/29/2018    CALCIUM 9.6 01/31/2018    BILITOT 0.5 01/29/2018    ALKPHOS 97 01/29/2018    AST 46 01/29/2018    ALT 31 01/29/2018     BMP:    Lab Results   Component Value Date     01/31/2018    K 3.7 01/31/2018    CL 94 01/31/2018    CO2 27 01/31/2018    BUN 13 01/31/2018    LABALBU 4.0 01/29/2018    CREATININE 0.98 01/31/2018    CALCIUM 9.6 01/31/2018    GFRAA >60.0 01/31/2018    LABGLOM >60.0 01/31/2018    GLUCOSE 151 01/31/2018     Magnesium:    Lab Results   Component Value Date    MG 1.9 01/31/2018     Troponin:    Lab Results   Component Value Date    TROPONINI <0.010 01/29/2018        Active Hospital Problems    Diagnosis Date Noted    Morbid obesity with BMI of 50.0-59.9, adult ([facility] [E66.01, Z68.43] 01/30/2018     Priority: Low    Smoker [F17.200] 01/30/2018     Priority: Low    Acute heart failure ([facility] [I50.9] 01/29/2018     Priority: Low    Trichomonas vaginalis (TV) infection [A59.01] 01/29/2018     Priority: Low    Essential hypertension [I10] 01/29/2018     Priority: Low    UTI (urinary tract infection) [N39.0] 01/29/2018     Priority: Low        Assessment/Plan:  1. ADHF - diuresing well with stable renal. Keep lasix gtt. Add Metolazone 5 mg x1 dose today. Advance K supplement. Follow labs  2. Postpartum CM stable EF ~50%  3. Still Tachycardic ST-  Advance Coreg more. 4. Rash-  ~ABX related?   Will give Benadryl        Electronically signed by Felecia Appiah MD on 1/31/2018 at 8:04 AM

## 2018-02-01 VITALS
WEIGHT: 283.51 LBS | BODY MASS INDEX: 48.4 KG/M2 | OXYGEN SATURATION: 92 % | HEART RATE: 92 BPM | TEMPERATURE: 97.7 F | RESPIRATION RATE: 18 BRPM | SYSTOLIC BLOOD PRESSURE: 126 MMHG | HEIGHT: 64 IN | DIASTOLIC BLOOD PRESSURE: 70 MMHG

## 2018-02-01 PROBLEM — I50.33 ACUTE ON CHRONIC DIASTOLIC CHF (CONGESTIVE HEART FAILURE) (HCC): Status: ACTIVE | Noted: 2018-02-01

## 2018-02-01 LAB
ANION GAP SERPL CALCULATED.3IONS-SCNC: 20 MEQ/L (ref 7–13)
BUN BLDV-MCNC: 19 MG/DL (ref 6–20)
CALCIUM SERPL-MCNC: 10.4 MG/DL (ref 8.6–10.2)
CHLORIDE BLD-SCNC: 89 MEQ/L (ref 98–107)
CO2: 30 MEQ/L (ref 22–29)
CREAT SERPL-MCNC: 1.39 MG/DL (ref 0.5–0.9)
GFR AFRICAN AMERICAN: 50.3
GFR NON-AFRICAN AMERICAN: 41.5
GLUCOSE BLD-MCNC: 149 MG/DL (ref 74–109)
POTASSIUM SERPL-SCNC: 4.2 MEQ/L (ref 3.5–5.1)
SODIUM BLD-SCNC: 139 MEQ/L (ref 132–144)

## 2018-02-01 PROCEDURE — 6360000002 HC RX W HCPCS: Performed by: INTERNAL MEDICINE

## 2018-02-01 PROCEDURE — 80048 BASIC METABOLIC PNL TOTAL CA: CPT

## 2018-02-01 PROCEDURE — 36415 COLL VENOUS BLD VENIPUNCTURE: CPT

## 2018-02-01 PROCEDURE — 6370000000 HC RX 637 (ALT 250 FOR IP): Performed by: INTERNAL MEDICINE

## 2018-02-01 PROCEDURE — 2580000003 HC RX 258: Performed by: INTERNAL MEDICINE

## 2018-02-01 PROCEDURE — 99232 SBSQ HOSP IP/OBS MODERATE 35: CPT | Performed by: INTERNAL MEDICINE

## 2018-02-01 PROCEDURE — 86703 HIV-1/HIV-2 1 RESULT ANTBDY: CPT

## 2018-02-01 RX ORDER — FUROSEMIDE 80 MG
80 TABLET ORAL DAILY
Qty: 60 TABLET | Refills: 3 | Status: SHIPPED | OUTPATIENT
Start: 2018-02-01 | End: 2018-02-01

## 2018-02-01 RX ORDER — FUROSEMIDE 80 MG
80 TABLET ORAL DAILY
Qty: 14 TABLET | Refills: 0 | Status: ON HOLD | OUTPATIENT
Start: 2018-02-01 | End: 2018-05-31 | Stop reason: HOSPADM

## 2018-02-01 RX ORDER — LISINOPRIL 20 MG/1
20 TABLET ORAL DAILY
Qty: 14 TABLET | Refills: 0 | Status: ON HOLD | OUTPATIENT
Start: 2018-02-01 | End: 2018-05-31 | Stop reason: HOSPADM

## 2018-02-01 RX ORDER — CARVEDILOL 25 MG/1
25 TABLET ORAL 2 TIMES DAILY WITH MEALS
Qty: 28 TABLET | Refills: 0 | Status: SHIPPED | OUTPATIENT
Start: 2018-02-01 | End: 2021-02-24

## 2018-02-01 RX ORDER — CEPHALEXIN 500 MG/1
500 CAPSULE ORAL EVERY 12 HOURS SCHEDULED
Qty: 10 CAPSULE | Refills: 0 | Status: SHIPPED | OUTPATIENT
Start: 2018-02-01 | End: 2018-02-06

## 2018-02-01 RX ORDER — POTASSIUM CHLORIDE 20 MEQ/1
20 TABLET, EXTENDED RELEASE ORAL DAILY
Qty: 60 TABLET | Refills: 3 | Status: SHIPPED | OUTPATIENT
Start: 2018-02-01 | End: 2018-02-01

## 2018-02-01 RX ORDER — POTASSIUM CHLORIDE 20 MEQ/1
20 TABLET, EXTENDED RELEASE ORAL DAILY
Qty: 14 TABLET | Refills: 0 | Status: ON HOLD | OUTPATIENT
Start: 2018-02-01 | End: 2018-05-31 | Stop reason: HOSPADM

## 2018-02-01 RX ORDER — CEPHALEXIN 500 MG/1
500 CAPSULE ORAL EVERY 12 HOURS SCHEDULED
Qty: 10 CAPSULE | Refills: 0 | Status: SHIPPED | OUTPATIENT
Start: 2018-02-01 | End: 2018-02-01

## 2018-02-01 RX ORDER — CARVEDILOL 25 MG/1
25 TABLET ORAL 2 TIMES DAILY WITH MEALS
Qty: 60 TABLET | Refills: 3 | Status: SHIPPED | OUTPATIENT
Start: 2018-02-01 | End: 2018-02-01

## 2018-02-01 RX ORDER — LISINOPRIL 20 MG/1
20 TABLET ORAL DAILY
Qty: 30 TABLET | Refills: 3 | Status: SHIPPED | OUTPATIENT
Start: 2018-02-01 | End: 2018-02-01

## 2018-02-01 RX ADMIN — POTASSIUM CHLORIDE 40 MEQ: 1500 TABLET, EXTENDED RELEASE ORAL at 09:52

## 2018-02-01 RX ADMIN — ACETAMINOPHEN 650 MG: 325 TABLET, FILM COATED ORAL at 01:27

## 2018-02-01 RX ADMIN — LISINOPRIL 20 MG: 20 TABLET ORAL at 09:52

## 2018-02-01 RX ADMIN — CEPHALEXIN 500 MG: 500 CAPSULE ORAL at 09:52

## 2018-02-01 RX ADMIN — FUROSEMIDE 10 MG/HR: 10 INJECTION, SOLUTION INTRAVENOUS at 01:27

## 2018-02-01 RX ADMIN — CARVEDILOL 25 MG: 25 TABLET, FILM COATED ORAL at 09:52

## 2018-02-01 ASSESSMENT — PAIN SCALES - GENERAL
PAINLEVEL_OUTOF10: 0
PAINLEVEL_OUTOF10: 0
PAINLEVEL_OUTOF10: 5
PAINLEVEL_OUTOF10: 0

## 2018-02-01 ASSESSMENT — ENCOUNTER SYMPTOMS
SHORTNESS OF BREATH: 0
NAUSEA: 0
RESPIRATORY NEGATIVE: 1
STRIDOR: 0
WHEEZING: 0
EYES NEGATIVE: 1
CHEST TIGHTNESS: 0
GASTROINTESTINAL NEGATIVE: 1
BLOOD IN STOOL: 0
COUGH: 0

## 2018-02-01 NOTE — DISCHARGE SUMMARY
Physician Discharge Summary     Patient ID:  Deon Avitia  [id number]  4 y.o.  1975    Admit date: 1/29/2018    Discharge date and time: No discharge date for patient encounter. Admitting Physician: Lobito Campbell MD     Discharge Physician: Lobito Campbell MD    Admission Diagnoses: Acute heart failure, unspecified heart failure type [facility]) [I50.9]    Discharge Diagnoses: Active Hospital Problems    Diagnosis Date Noted    Acute on chronic diastolic [facility]congestive [facility] [I50.33] 02/01/2018    Morbid obesity with BMI of 50.0-59.9, adult ([facility] [E66.01, Z68.43] 01/30/2018    Smoker [F17.200] 01/30/2018    Trichomonas vaginalis (TV) infection [A59.01] 01/29/2018    Essential hypertension [I10] 01/29/2018    UTI (urinary tract infection) [N39.0] 01/29/2018         Admission Condition: fair    Discharged Condition: good    Indication for Admission: shortness of breath    Hospital Course: Patient was admitted with swelling in her lower extremities and abdomen. She was diagnosed with severe diastolic dysfunction. She was treated with lasix drip for 2 days and achieved negative fluid balance of 18 litres. She was also diagnosed with trichomonas vaginalis and was treated for the same. She was advised to have her partner treated for the same. She was also diagnosed with UTI . She had US of her lower extremities and was negative for DVT.  She was advised to follow up with Dr Asa Arthur in 2 weeks    Consults: cardiology    Significant Diagnostic Studies: echo    Treatments: antibiotics: keflex   and cardiac meds: furosemide drip    Discharge Exam:  /70   Pulse 92   Temp 97.7 °F (36.5 °C) (Oral)   Resp 18   Ht 5' 4\" (1.626 m)   Wt 283 lb 8.2 oz (128.6 kg)   LMP 01/29/2018 (Exact Date)   SpO2 92%   BMI 48.66 kg/m²     General Appearance:    Alert, cooperative, no distress, appears stated age   Head:    Normocephalic, without obvious abnormality, atraumatic   Eyes:    PERRL, tablet by mouth daily  Qty: 60 tablet, Refills: 3      cephALEXin (KEFLEX) 500 MG capsule Take 1 capsule by mouth every 12 hours for 5 days  Qty: 10 capsule, Refills: 0         CONTINUE these medications which have NOT CHANGED    Details   Multiple Vitamins-Minerals (THERAPEUTIC MULTIVITAMIN-MINERALS) tablet Take 1 tablet by mouth daily           Activity: activity as tolerated  Diet: cardiac diet  Wound Care: none needed    Follow-up with Dr Beronica Villanueva  in 2 week. I spent more than 30 minutes in talking to the patient and rpeparing for the discharge.     Signed:  Mattie Lr MD  Pager : [phone]    2/1/2018  10:01 AM

## 2018-02-01 NOTE — PROGRESS NOTES
Progress Note  Patient: Judy Wong  Unit/Bed: E040/U561-01  YOB: 1975  MRN: [id number]  Acct: [de-identified]   Admitting Diagnosis: Acute heart failure, unspecified heart failure type [facility]) [I50.9]  Admit Date:  1/29/2018  Hospital Day: 3    Chief Complaint: HF    Histories:  Past Medical History:   Diagnosis Date    CHF (congestive heart failure) ([facility] [address]      History reviewed. No pertinent surgical history. History reviewed. No pertinent family history. Social History     Social History    Marital status: Single     Spouse name: N/A    Number of children: N/A    Years of education: N/A     Social History Main Topics    Smoking status: Current Every Day Smoker    Smokeless tobacco: Never Used      Comment: smoked 0.5 PPD until 1 month ago for past 21 years. Currently smoking 2 cigarettes per day    Alcohol use 3.6 oz/week     6 Cans of beer per week    Drug use: Yes     Types: Cocaine      Comment: used few months ago    Sexual activity: Not Asked     Other Topics Concern    None     Social History Narrative    None       Subjective/HPI:  diuresd 18L since admit. Cr starting to rise  1.39 today. Had been on Lasix drip and received 1 dose Metolazone 5mg yesterday. Feels better. No CP  On ABX for UTI and possible leg cellulitis    EKG:SR        Review of Systems:   Review of Systems   Constitutional: Negative. Negative for diaphoresis and fatigue. HENT: Negative. Eyes: Negative. Respiratory: Negative. Negative for cough, chest tightness, shortness of breath, wheezing and stridor. Cardiovascular: Positive for leg swelling. Negative for chest pain and palpitations. Gastrointestinal: Negative. Negative for blood in stool and nausea. Genitourinary: Negative. Musculoskeletal: Negative. Skin: Positive for rash. Neurological: Negative. Negative for dizziness, syncope, weakness and light-headedness. Hematological: Negative. Psychiatric/Behavioral: Negative. Physical Examination:    /70   Pulse 92   Temp 97.7 °F (36.5 °C) (Oral)   Resp 18   Ht 5' 4\" (1.626 m)   Wt 283 lb 8.2 oz (128.6 kg)   LMP 01/29/2018 (Exact Date)   SpO2 92%   BMI 48.66 kg/m²    Physical Exam   Constitutional: She appears healthy. No distress. HENT:   Normal cephalic and Atraumatic   Eyes: Pupils are equal, round, and reactive to light. Neck: Normal range of motion and thyroid normal. Neck supple. No JVD present. No neck adenopathy. No thyromegaly present. Cardiovascular: Normal rate, regular rhythm, normal heart sounds, intact distal pulses and normal pulses. Pulmonary/Chest: Effort normal and breath sounds normal. She has no wheezes. She has no rales. She exhibits no tenderness. Abdominal: Soft. Bowel sounds are normal. There is no tenderness. Musculoskeletal: Normal range of motion. She exhibits edema (mild). She exhibits no tenderness. Neurological: She is alert and oriented to person, place, and time. Skin: Skin is warm. No cyanosis. Nails show no clubbing.        LABS:  CBC:   Lab Results   Component Value Date    WBC 6.4 01/30/2018    RBC 4.90 01/30/2018    HGB 13.9 01/30/2018    HCT 43.5 01/30/2018    MCV 88.7 01/30/2018    MCH 28.3 01/30/2018    MCHC 31.9 01/30/2018    RDW 15.2 01/30/2018     01/30/2018     CBC with Differential:    Lab Results   Component Value Date    WBC 6.4 01/30/2018    RBC 4.90 01/30/2018    HGB 13.9 01/30/2018    HCT 43.5 01/30/2018     01/30/2018    MCV 88.7 01/30/2018    MCH 28.3 01/30/2018    MCHC 31.9 01/30/2018    RDW 15.2 01/30/2018    LYMPHOPCT 16.6 01/30/2018    MONOPCT 10.9 01/30/2018    BASOPCT 1.0 01/30/2018    MONOSABS 0.7 01/30/2018    LYMPHSABS 1.1 01/30/2018    EOSABS 0.1 01/30/2018    BASOSABS 0.1 01/30/2018     CMP:    Lab Results   Component Value Date     02/01/2018    K 4.2 02/01/2018    CL 89 02/01/2018    CO2 30 02/01/2018    BUN 19 02/01/2018    CREATININE 1.39 02/01/2018    GFRAA 50.3

## 2018-02-03 LAB — HIV-1 AND HIV-2 ANTIBODIES: NEGATIVE

## 2018-02-07 ENCOUNTER — CARE COORDINATION (OUTPATIENT)
Dept: CASE MANAGEMENT | Age: 43
End: 2018-02-07

## 2018-02-07 NOTE — CARE COORDINATION
Date of discharge: 2/1/2018  Facility: [facility]  Scheduled appointment with PCP-none. Patient will call referral line to establish PCP  Scheduled appointment with Specialist-Dr. Adina Chowdary 2/8/2018  Obtained and reviewed discharge summary yes  Reviewed and followed up on pending diagnostic tests and treatments-BMP 3/8/2018  Education of patient/family/caregiver/guardian to support self-management-CHF booklet and zones given and reviewed in hospital prior to discharge  Assessment and support for treatment adherence and medication management-compliant  Establishment or re-establishment of referrals-n/a  Assistance in accessing community resource n/a    Reason for Hospital Visit:  CHF    Date of first visit after discharge:  Call 2/7/2018 (per patient request. Patient unavailable earlier)    Did you receive a discharge summary with list of medication from the hospital? Yes  Review of Instructions:     Understands what to report/when to return?:  Yes, will phone Dr. Adina Chowdary for weight gain of 3 pounds in 1-7 days, sob, edema. She is weighing herself daily and denies weight gain, sob, or edema   Understands discharge instructions?:  Yes   Following discharge instructions?:  Yes   If not why? Is there any lingering symptoms? No  How is your appetite-good  How much are you drinking three 16 oz bottles of water daily. Patient was limited to 1200 mls in hospital and was told to just \"cut back\" on fluid intake. Patient sees Dr. Adina Chowdary 2/8/2018 and CTN advised her to ask Dr. Adina Chowdary for a specific amount of fluid that she can have daily. She voiced  Understanding. What are you eating-low salt diet. Lots of fresh vegetables and chicken. She has cut out \"fast food\"  completely  Any other problems i.e. Constipation, other symptoms? No  Are there any new complaints of pain?  No      New Medications at discharge?:     Yes,advised to take keflex until gone                      Medication Reconciliation by phone -    Yes    Were there

## 2018-02-21 ENCOUNTER — OFFICE VISIT (OUTPATIENT)
Dept: CARDIOLOGY CLINIC | Age: 43
End: 2018-02-21
Payer: MEDICAID

## 2018-02-21 VITALS
HEIGHT: 64 IN | RESPIRATION RATE: 16 BRPM | SYSTOLIC BLOOD PRESSURE: 122 MMHG | HEART RATE: 92 BPM | WEIGHT: 266 LBS | DIASTOLIC BLOOD PRESSURE: 76 MMHG | BODY MASS INDEX: 45.41 KG/M2 | OXYGEN SATURATION: 95 %

## 2018-02-21 DIAGNOSIS — N17.9 AKI (ACUTE KIDNEY INJURY) (HCC): Primary | ICD-10-CM

## 2018-02-21 DIAGNOSIS — I10 ESSENTIAL HYPERTENSION: ICD-10-CM

## 2018-02-21 DIAGNOSIS — I50.33 ACUTE ON CHRONIC DIASTOLIC CHF (CONGESTIVE HEART FAILURE) (HCC): ICD-10-CM

## 2018-02-21 DIAGNOSIS — F17.200 SMOKER: ICD-10-CM

## 2018-02-21 DIAGNOSIS — I50.31 ACUTE DIASTOLIC HEART FAILURE (HCC): ICD-10-CM

## 2018-02-21 PROCEDURE — 99214 OFFICE O/P EST MOD 30 MIN: CPT | Performed by: INTERNAL MEDICINE

## 2018-02-21 ASSESSMENT — ENCOUNTER SYMPTOMS
NAUSEA: 0
EYES NEGATIVE: 1
GASTROINTESTINAL NEGATIVE: 1
WHEEZING: 0
COUGH: 0
SHORTNESS OF BREATH: 1
BLOOD IN STOOL: 0
CHEST TIGHTNESS: 0
STRIDOR: 0

## 2018-02-21 NOTE — PROGRESS NOTES
tablet by mouth daily       No current facility-administered medications for this visit. Review of Systems:   Review of Systems   Constitutional: Negative. Negative for diaphoresis and fatigue. HENT: Negative. Eyes: Negative. Respiratory: Positive for shortness of breath. Negative for cough, chest tightness, wheezing and stridor. Cardiovascular: Positive for leg swelling. Negative for chest pain and palpitations. Gastrointestinal: Negative. Negative for blood in stool and nausea. Genitourinary: Negative. Musculoskeletal: Negative. Skin: Negative. Neurological: Negative. Negative for dizziness, syncope, weakness and light-headedness. Hematological: Negative. Psychiatric/Behavioral: Negative. Physical Examination:    /76 (Site: Right Arm, Position: Sitting, Cuff Size: Large Adult)   Pulse 92   Resp 16   Ht 5' 4\" (1.626 m)   Wt 266 lb (120.7 kg)   LMP 01/29/2018 (Exact Date)   SpO2 95%   BMI 45.66 kg/m²    Physical Exam   Constitutional: She appears healthy. No distress. HENT:   Normal cephalic and Atraumatic   Eyes: Pupils are equal, round, and reactive to light. Neck: Normal range of motion and thyroid normal. Neck supple. No JVD present. No neck adenopathy. No thyromegaly present. Cardiovascular: Normal rate, regular rhythm, normal heart sounds, intact distal pulses and normal pulses. Pulmonary/Chest: Effort normal and breath sounds normal. She has no wheezes. She has no rales. She exhibits no tenderness. Abdominal: Soft. Bowel sounds are normal. There is no tenderness. Musculoskeletal: Normal range of motion. She exhibits edema (trace). She exhibits no tenderness. Neurological: She is alert and oriented to person, place, and time. Skin: Skin is warm. No cyanosis. Nails show no clubbing.        LABS:  CBC:   Lab Results   Component Value Date    WBC 6.4 01/30/2018    RBC 4.90 01/30/2018    HGB 13.9 01/30/2018    HCT 43.5 01/30/2018    MCV

## 2018-04-11 PROBLEM — N39.0 UTI (URINARY TRACT INFECTION): Status: RESOLVED | Noted: 2018-01-29 | Resolved: 2018-04-11

## 2018-05-27 ENCOUNTER — APPOINTMENT (OUTPATIENT)
Dept: GENERAL RADIOLOGY | Age: 43
DRG: 638 | End: 2018-05-27

## 2018-05-27 ENCOUNTER — APPOINTMENT (OUTPATIENT)
Dept: ULTRASOUND IMAGING | Age: 43
DRG: 638 | End: 2018-05-27

## 2018-05-27 ENCOUNTER — HOSPITAL ENCOUNTER (INPATIENT)
Age: 43
LOS: 5 days | Discharge: HOME | DRG: 638 | End: 2018-06-01
Attending: INTERNAL MEDICINE | Admitting: INTERNAL MEDICINE

## 2018-05-27 DIAGNOSIS — E11.10 DIABETIC KETOACIDOSIS WITHOUT COMA ASSOCIATED WITH TYPE 2 DIABETES MELLITUS (HCC): Primary | ICD-10-CM

## 2018-05-27 DIAGNOSIS — E86.0 DEHYDRATION: ICD-10-CM

## 2018-05-27 LAB
ALBUMIN SERPL-MCNC: 3.8 G/DL (ref 3.9–4.9)
ALP BLD-CCNC: 180 U/L (ref 40–130)
ALT SERPL-CCNC: 34 U/L (ref 0–33)
AMYLASE: 166 U/L (ref 28–100)
ANION GAP SERPL CALCULATED.3IONS-SCNC: 26 MEQ/L (ref 7–13)
ANION GAP SERPL CALCULATED.3IONS-SCNC: 38 MEQ/L (ref 7–13)
AST SERPL-CCNC: 14 U/L (ref 0–35)
BASE EXCESS ARTERIAL: -15 (ref -3–3)
BASOPHILS ABSOLUTE: 0.1 K/UL (ref 0–0.2)
BASOPHILS RELATIVE PERCENT: 0.8 %
BETA-HYDROXYBUTYRATE: 134.1 MG/DL (ref 0.2–2.8)
BILIRUB SERPL-MCNC: 0.4 MG/DL (ref 0–1.2)
BUN BLDV-MCNC: 26 MG/DL (ref 6–20)
BUN BLDV-MCNC: 26 MG/DL (ref 6–20)
CALCIUM SERPL-MCNC: 8.6 MG/DL (ref 8.6–10.2)
CALCIUM SERPL-MCNC: 9 MG/DL (ref 8.6–10.2)
CHLORIDE BLD-SCNC: 77 MEQ/L (ref 98–107)
CHLORIDE BLD-SCNC: 92 MEQ/L (ref 98–107)
CO2: 10 MEQ/L (ref 22–29)
CO2: 15 MEQ/L (ref 22–29)
CREAT SERPL-MCNC: 1.03 MG/DL (ref 0.5–0.9)
CREAT SERPL-MCNC: 1.18 MG/DL (ref 0.5–0.9)
EKG ATRIAL RATE: 93 BPM
EKG P AXIS: 67 DEGREES
EKG P-R INTERVAL: 142 MS
EKG Q-T INTERVAL: 368 MS
EKG QRS DURATION: 94 MS
EKG QTC CALCULATION (BAZETT): 457 MS
EKG R AXIS: 49 DEGREES
EKG T AXIS: 11 DEGREES
EKG VENTRICULAR RATE: 93 BPM
EOSINOPHILS ABSOLUTE: 0 K/UL (ref 0–0.7)
EOSINOPHILS RELATIVE PERCENT: 0.1 %
GFR AFRICAN AMERICAN: >60
GFR AFRICAN AMERICAN: >60
GFR NON-AFRICAN AMERICAN: 50.1
GFR NON-AFRICAN AMERICAN: 58.6
GLOBULIN: 2.4 G/DL (ref 2.3–3.5)
GLUCOSE BLD-MCNC: 282 MG/DL (ref 60–115)
GLUCOSE BLD-MCNC: 390 MG/DL (ref 60–115)
GLUCOSE BLD-MCNC: 520 MG/DL (ref 60–115)
GLUCOSE BLD-MCNC: 536 MG/DL (ref 74–109)
GLUCOSE BLD-MCNC: 584 MG/DL (ref 60–115)
GLUCOSE BLD-MCNC: 600 MG/DL
GLUCOSE BLD-MCNC: 784 MG/DL (ref 74–109)
GLUCOSE BLD-MCNC: 874 MG/DL (ref 74–109)
GLUCOSE BLD-MCNC: 996 MG/DL (ref 74–109)
GLUCOSE BLD-MCNC: >600 MG/DL (ref 60–115)
HBA1C MFR BLD: 15.6 % (ref 4.8–5.9)
HBA1C MFR BLD: 15.6 % (ref 4.8–5.9)
HCO3 ARTERIAL: 10.7 MMOL/L (ref 21–29)
HCT VFR BLD CALC: 42.2 % (ref 37–47)
HEMOGLOBIN: 13.2 G/DL (ref 12–16)
LACTATE: 1.6 MMOL/L (ref 0.4–2)
LACTIC ACID: 2.1 MMOL/L (ref 0.5–2.2)
LIPASE: 504 U/L (ref 13–60)
LYMPHOCYTES ABSOLUTE: 1.4 K/UL (ref 1–4.8)
LYMPHOCYTES RELATIVE PERCENT: 11.7 %
MAGNESIUM: 2.4 MG/DL (ref 1.7–2.3)
MCH RBC QN AUTO: 31 PG (ref 27–31.3)
MCHC RBC AUTO-ENTMCNC: 31.2 % (ref 33–37)
MCV RBC AUTO: 99.2 FL (ref 82–100)
MONOCYTES ABSOLUTE: 1 K/UL (ref 0.2–0.8)
MONOCYTES RELATIVE PERCENT: 8.4 %
NEUTROPHILS ABSOLUTE: 9.2 K/UL (ref 1.4–6.5)
NEUTROPHILS RELATIVE PERCENT: 79 %
O2 SAT, ARTERIAL: 89 % (ref 93–100)
PCO2 ARTERIAL: 20 MM HG (ref 35–45)
PDW BLD-RTO: 13.7 % (ref 11.5–14.5)
PERFORMED ON: ABNORMAL
PH ARTERIAL: 7.33 (ref 7.35–7.45)
PHOSPHORUS: 2 MG/DL (ref 2.5–4.5)
PLATELET # BLD: 259 K/UL (ref 130–400)
PO2 ARTERIAL: 58 MM HG (ref 75–108)
POC SAMPLE TYPE: ABNORMAL
POTASSIUM SERPL-SCNC: 4.2 MEQ/L (ref 3.5–5.1)
POTASSIUM SERPL-SCNC: 5.5 MEQ/L (ref 3.5–5.1)
PRO-BNP: 163 PG/ML
RBC # BLD: 4.25 M/UL (ref 4.2–5.4)
SODIUM BLD-SCNC: 125 MEQ/L (ref 132–144)
SODIUM BLD-SCNC: 133 MEQ/L (ref 132–144)
TCO2 ARTERIAL: 11 (ref 22–29)
TOTAL PROTEIN: 6.2 G/DL (ref 6.4–8.1)
WBC # BLD: 11.7 K/UL (ref 4.8–10.8)

## 2018-05-27 PROCEDURE — 83735 ASSAY OF MAGNESIUM: CPT

## 2018-05-27 PROCEDURE — 83690 ASSAY OF LIPASE: CPT

## 2018-05-27 PROCEDURE — 82803 BLOOD GASES ANY COMBINATION: CPT

## 2018-05-27 PROCEDURE — 6360000002 HC RX W HCPCS: Performed by: INTERNAL MEDICINE

## 2018-05-27 PROCEDURE — 36600 WITHDRAWAL OF ARTERIAL BLOOD: CPT

## 2018-05-27 PROCEDURE — 36415 COLL VENOUS BLD VENIPUNCTURE: CPT

## 2018-05-27 PROCEDURE — 2000000000 HC ICU R&B

## 2018-05-27 PROCEDURE — 84100 ASSAY OF PHOSPHORUS: CPT

## 2018-05-27 PROCEDURE — 6370000000 HC RX 637 (ALT 250 FOR IP): Performed by: INTERNAL MEDICINE

## 2018-05-27 PROCEDURE — 83605 ASSAY OF LACTIC ACID: CPT

## 2018-05-27 PROCEDURE — 99255 IP/OBS CONSLTJ NEW/EST HI 80: CPT | Performed by: INTERNAL MEDICINE

## 2018-05-27 PROCEDURE — 71045 X-RAY EXAM CHEST 1 VIEW: CPT

## 2018-05-27 PROCEDURE — 85025 COMPLETE CBC W/AUTO DIFF WBC: CPT

## 2018-05-27 PROCEDURE — 6360000002 HC RX W HCPCS: Performed by: PHYSICIAN ASSISTANT

## 2018-05-27 PROCEDURE — 83880 ASSAY OF NATRIURETIC PEPTIDE: CPT

## 2018-05-27 PROCEDURE — 6370000000 HC RX 637 (ALT 250 FOR IP): Performed by: PHYSICIAN ASSISTANT

## 2018-05-27 PROCEDURE — 82948 REAGENT STRIP/BLOOD GLUCOSE: CPT

## 2018-05-27 PROCEDURE — 82010 KETONE BODYS QUAN: CPT

## 2018-05-27 PROCEDURE — 80053 COMPREHEN METABOLIC PANEL: CPT

## 2018-05-27 PROCEDURE — 2580000003 HC RX 258: Performed by: PHYSICIAN ASSISTANT

## 2018-05-27 PROCEDURE — 2580000003 HC RX 258: Performed by: INTERNAL MEDICINE

## 2018-05-27 PROCEDURE — 76705 ECHO EXAM OF ABDOMEN: CPT

## 2018-05-27 PROCEDURE — 99285 EMERGENCY DEPT VISIT HI MDM: CPT

## 2018-05-27 PROCEDURE — 83036 HEMOGLOBIN GLYCOSYLATED A1C: CPT

## 2018-05-27 PROCEDURE — 93005 ELECTROCARDIOGRAM TRACING: CPT

## 2018-05-27 PROCEDURE — 82150 ASSAY OF AMYLASE: CPT

## 2018-05-27 PROCEDURE — 82947 ASSAY GLUCOSE BLOOD QUANT: CPT

## 2018-05-27 PROCEDURE — 99222 1ST HOSP IP/OBS MODERATE 55: CPT | Performed by: PHYSICIAN ASSISTANT

## 2018-05-27 RX ORDER — POTASSIUM CHLORIDE 7.45 MG/ML
10 INJECTION INTRAVENOUS PRN
Status: DISCONTINUED | OUTPATIENT
Start: 2018-05-27 | End: 2018-06-01 | Stop reason: HOSPADM

## 2018-05-27 RX ORDER — SODIUM CHLORIDE 9 MG/ML
INJECTION, SOLUTION INTRAVENOUS CONTINUOUS
Status: DISCONTINUED | OUTPATIENT
Start: 2018-05-27 | End: 2018-05-27 | Stop reason: CLARIF

## 2018-05-27 RX ORDER — 0.9 % SODIUM CHLORIDE 0.9 %
1000 INTRAVENOUS SOLUTION INTRAVENOUS ONCE
Status: COMPLETED | OUTPATIENT
Start: 2018-05-27 | End: 2018-05-27

## 2018-05-27 RX ORDER — SODIUM CHLORIDE 9 MG/ML
INJECTION, SOLUTION INTRAVENOUS CONTINUOUS
Status: DISCONTINUED | OUTPATIENT
Start: 2018-05-27 | End: 2018-05-29

## 2018-05-27 RX ORDER — IPRATROPIUM BROMIDE AND ALBUTEROL SULFATE 2.5; .5 MG/3ML; MG/3ML
1 SOLUTION RESPIRATORY (INHALATION) EVERY 4 HOURS PRN
Status: DISCONTINUED | OUTPATIENT
Start: 2018-05-27 | End: 2018-06-01 | Stop reason: HOSPADM

## 2018-05-27 RX ORDER — 0.9 % SODIUM CHLORIDE 0.9 %
15 INTRAVENOUS SOLUTION INTRAVENOUS ONCE
Status: COMPLETED | OUTPATIENT
Start: 2018-05-27 | End: 2018-05-27

## 2018-05-27 RX ORDER — DEXTROSE AND SODIUM CHLORIDE 5; .45 G/100ML; G/100ML
INJECTION, SOLUTION INTRAVENOUS CONTINUOUS PRN
Status: DISCONTINUED | OUTPATIENT
Start: 2018-05-27 | End: 2018-05-27

## 2018-05-27 RX ORDER — DEXTROSE MONOHYDRATE 25 G/50ML
12.5 INJECTION, SOLUTION INTRAVENOUS PRN
Status: DISCONTINUED | OUTPATIENT
Start: 2018-05-27 | End: 2018-05-27 | Stop reason: SDUPTHER

## 2018-05-27 RX ORDER — POTASSIUM CHLORIDE 7.45 MG/ML
10 INJECTION INTRAVENOUS PRN
Status: DISCONTINUED | OUTPATIENT
Start: 2018-05-27 | End: 2018-05-27

## 2018-05-27 RX ORDER — MAGNESIUM SULFATE 1 G/100ML
1 INJECTION INTRAVENOUS PRN
Status: DISCONTINUED | OUTPATIENT
Start: 2018-05-27 | End: 2018-06-01 | Stop reason: HOSPADM

## 2018-05-27 RX ORDER — CARVEDILOL 25 MG/1
25 TABLET ORAL 2 TIMES DAILY WITH MEALS
Status: DISCONTINUED | OUTPATIENT
Start: 2018-05-27 | End: 2018-06-01 | Stop reason: HOSPADM

## 2018-05-27 RX ORDER — FENTANYL CITRATE 50 UG/ML
25 INJECTION, SOLUTION INTRAMUSCULAR; INTRAVENOUS ONCE
Status: COMPLETED | OUTPATIENT
Start: 2018-05-27 | End: 2018-05-27

## 2018-05-27 RX ORDER — ASPIRIN 81 MG/1
81 TABLET, CHEWABLE ORAL DAILY
Status: DISCONTINUED | OUTPATIENT
Start: 2018-05-27 | End: 2018-06-01 | Stop reason: HOSPADM

## 2018-05-27 RX ORDER — DEXTROSE MONOHYDRATE 25 G/50ML
12.5 INJECTION, SOLUTION INTRAVENOUS PRN
Status: DISCONTINUED | OUTPATIENT
Start: 2018-05-27 | End: 2018-06-01 | Stop reason: HOSPADM

## 2018-05-27 RX ORDER — MAGNESIUM SULFATE 1 G/100ML
1 INJECTION INTRAVENOUS PRN
Status: DISCONTINUED | OUTPATIENT
Start: 2018-05-27 | End: 2018-05-27

## 2018-05-27 RX ORDER — ONDANSETRON 2 MG/ML
4 INJECTION INTRAMUSCULAR; INTRAVENOUS ONCE
Status: COMPLETED | OUTPATIENT
Start: 2018-05-27 | End: 2018-05-27

## 2018-05-27 RX ORDER — ONDANSETRON 2 MG/ML
4 INJECTION INTRAMUSCULAR; INTRAVENOUS EVERY 6 HOURS PRN
Status: DISCONTINUED | OUTPATIENT
Start: 2018-05-27 | End: 2018-06-01 | Stop reason: HOSPADM

## 2018-05-27 RX ORDER — FENTANYL CITRATE 50 UG/ML
50 INJECTION, SOLUTION INTRAMUSCULAR; INTRAVENOUS ONCE
Status: COMPLETED | OUTPATIENT
Start: 2018-05-27 | End: 2018-05-27

## 2018-05-27 RX ORDER — DEXTROSE AND SODIUM CHLORIDE 5; .45 G/100ML; G/100ML
INJECTION, SOLUTION INTRAVENOUS CONTINUOUS
Status: DISCONTINUED | OUTPATIENT
Start: 2018-05-28 | End: 2018-05-28

## 2018-05-27 RX ADMIN — SODIUM CHLORIDE 1000 ML: 9 INJECTION, SOLUTION INTRAVENOUS at 14:02

## 2018-05-27 RX ADMIN — SODIUM CHLORIDE 1 UNITS/HR: 9 INJECTION, SOLUTION INTRAVENOUS at 13:58

## 2018-05-27 RX ADMIN — ASPIRIN 81 MG 81 MG: 81 TABLET ORAL at 16:46

## 2018-05-27 RX ADMIN — CARVEDILOL 25 MG: 25 TABLET, FILM COATED ORAL at 16:46

## 2018-05-27 RX ADMIN — SODIUM CHLORIDE: 9 INJECTION, SOLUTION INTRAVENOUS at 20:42

## 2018-05-27 RX ADMIN — SODIUM CHLORIDE 5.45 UNITS/HR: 9 INJECTION, SOLUTION INTRAVENOUS at 18:00

## 2018-05-27 RX ADMIN — SODIUM CHLORIDE 1000 ML: 9 INJECTION, SOLUTION INTRAVENOUS at 12:45

## 2018-05-27 RX ADMIN — SODIUM CHLORIDE 12.3 UNITS/HR: 9 INJECTION, SOLUTION INTRAVENOUS at 23:58

## 2018-05-27 RX ADMIN — FENTANYL CITRATE 25 MCG: 50 INJECTION INTRAMUSCULAR; INTRAVENOUS at 21:52

## 2018-05-27 RX ADMIN — SODIUM CHLORIDE: 9 INJECTION, SOLUTION INTRAVENOUS at 16:40

## 2018-05-27 RX ADMIN — ONDANSETRON 4 MG: 2 INJECTION INTRAMUSCULAR; INTRAVENOUS at 14:03

## 2018-05-27 RX ADMIN — FENTANYL CITRATE 50 MCG: 50 INJECTION INTRAMUSCULAR; INTRAVENOUS at 14:03

## 2018-05-27 RX ADMIN — ENOXAPARIN SODIUM 40 MG: 40 INJECTION SUBCUTANEOUS at 16:47

## 2018-05-27 RX ADMIN — ONDANSETRON 4 MG: 2 INJECTION INTRAMUSCULAR; INTRAVENOUS at 20:29

## 2018-05-27 RX ADMIN — DEXTROSE AND SODIUM CHLORIDE: 5; 450 INJECTION, SOLUTION INTRAVENOUS at 23:58

## 2018-05-27 RX ADMIN — SODIUM CHLORIDE 1905 ML: 9 INJECTION, SOLUTION INTRAVENOUS at 16:00

## 2018-05-27 ASSESSMENT — PAIN DESCRIPTION - LOCATION
LOCATION: ABDOMEN

## 2018-05-27 ASSESSMENT — ENCOUNTER SYMPTOMS
COUGH: 0
VOMITING: 0
RHINORRHEA: 0
BLURRED VISION: 1
DIARRHEA: 0
WHEEZING: 0
STRIDOR: 0
SHORTNESS OF BREATH: 1
VOMITING: 1
NAUSEA: 0
CHOKING: 0
RECTAL PAIN: 0
COLOR CHANGE: 0
CONSTIPATION: 0
DOUBLE VISION: 0
ABDOMINAL DISTENTION: 0
ABDOMINAL PAIN: 0
NAUSEA: 1
SORE THROAT: 0
ABDOMINAL PAIN: 1
BACK PAIN: 0
EYE DISCHARGE: 0
SHORTNESS OF BREATH: 0

## 2018-05-27 ASSESSMENT — PAIN SCALES - GENERAL
PAINLEVEL_OUTOF10: 5
PAINLEVEL_OUTOF10: 5
PAINLEVEL_OUTOF10: 0
PAINLEVEL_OUTOF10: 4
PAINLEVEL_OUTOF10: 1

## 2018-05-27 ASSESSMENT — PAIN DESCRIPTION - PAIN TYPE
TYPE: ACUTE PAIN
TYPE: ACUTE PAIN;CHRONIC PAIN
TYPE: ACUTE PAIN;CHRONIC PAIN
TYPE: ACUTE PAIN
TYPE: ACUTE PAIN

## 2018-05-27 ASSESSMENT — PAIN DESCRIPTION - ORIENTATION
ORIENTATION: LEFT
ORIENTATION: LEFT

## 2018-05-27 ASSESSMENT — PAIN DESCRIPTION - FREQUENCY: FREQUENCY: INTERMITTENT

## 2018-05-27 ASSESSMENT — PAIN DESCRIPTION - DESCRIPTORS: DESCRIPTORS: DISCOMFORT;SHARP;STABBING

## 2018-05-27 ASSESSMENT — PAIN DESCRIPTION - ONSET: ONSET: ON-GOING

## 2018-05-28 LAB
ANION GAP SERPL CALCULATED.3IONS-SCNC: 15 MEQ/L (ref 7–13)
ANION GAP SERPL CALCULATED.3IONS-SCNC: 15 MEQ/L (ref 7–13)
ANION GAP SERPL CALCULATED.3IONS-SCNC: 16 MEQ/L (ref 7–13)
BACTERIA: ABNORMAL /HPF
BILIRUBIN URINE: NEGATIVE
BLOOD, URINE: ABNORMAL
BUN BLDV-MCNC: 10 MG/DL (ref 6–20)
BUN BLDV-MCNC: 13 MG/DL (ref 6–20)
BUN BLDV-MCNC: 21 MG/DL (ref 6–20)
CALCIUM SERPL-MCNC: 7.4 MG/DL (ref 8.6–10.2)
CALCIUM SERPL-MCNC: 7.9 MG/DL (ref 8.6–10.2)
CALCIUM SERPL-MCNC: 7.9 MG/DL (ref 8.6–10.2)
CHLORIDE BLD-SCNC: 98 MEQ/L (ref 98–107)
CHLORIDE BLD-SCNC: 98 MEQ/L (ref 98–107)
CHLORIDE BLD-SCNC: 99 MEQ/L (ref 98–107)
CLARITY: ABNORMAL
CO2: 21 MEQ/L (ref 22–29)
CO2: 21 MEQ/L (ref 22–29)
CO2: 23 MEQ/L (ref 22–29)
COLOR: YELLOW
CREAT SERPL-MCNC: 0.64 MG/DL (ref 0.5–0.9)
CREAT SERPL-MCNC: 0.66 MG/DL (ref 0.5–0.9)
CREAT SERPL-MCNC: 0.87 MG/DL (ref 0.5–0.9)
EPITHELIAL CELLS, UA: ABNORMAL /HPF
GFR AFRICAN AMERICAN: >60
GFR NON-AFRICAN AMERICAN: >60
GLUCOSE BLD-MCNC: 157 MG/DL (ref 60–115)
GLUCOSE BLD-MCNC: 163 MG/DL (ref 74–109)
GLUCOSE BLD-MCNC: 168 MG/DL (ref 60–115)
GLUCOSE BLD-MCNC: 168 MG/DL (ref 60–115)
GLUCOSE BLD-MCNC: 171 MG/DL (ref 60–115)
GLUCOSE BLD-MCNC: 175 MG/DL (ref 60–115)
GLUCOSE BLD-MCNC: 176 MG/DL (ref 60–115)
GLUCOSE BLD-MCNC: 178 MG/DL (ref 60–115)
GLUCOSE BLD-MCNC: 187 MG/DL (ref 60–115)
GLUCOSE BLD-MCNC: 189 MG/DL (ref 60–115)
GLUCOSE BLD-MCNC: 198 MG/DL (ref 60–115)
GLUCOSE BLD-MCNC: 202 MG/DL (ref 74–109)
GLUCOSE BLD-MCNC: 210 MG/DL (ref 60–115)
GLUCOSE BLD-MCNC: 214 MG/DL (ref 60–115)
GLUCOSE BLD-MCNC: 218 MG/DL (ref 74–109)
GLUCOSE BLD-MCNC: 221 MG/DL (ref 60–115)
GLUCOSE BLD-MCNC: 221 MG/DL (ref 60–115)
GLUCOSE BLD-MCNC: 224 MG/DL (ref 60–115)
GLUCOSE BLD-MCNC: 234 MG/DL (ref 60–115)
GLUCOSE BLD-MCNC: 237 MG/DL (ref 60–115)
GLUCOSE BLD-MCNC: 330 MG/DL (ref 60–115)
GLUCOSE URINE: 500 MG/DL
KETONES, URINE: NEGATIVE MG/DL
LEUKOCYTE ESTERASE, URINE: ABNORMAL
MAGNESIUM: 1.8 MG/DL (ref 1.7–2.3)
MAGNESIUM: 1.9 MG/DL (ref 1.7–2.3)
MAGNESIUM: 2.1 MG/DL (ref 1.7–2.3)
NITRITE, URINE: NEGATIVE
PERFORMED ON: ABNORMAL
PH UA: 6 (ref 5–9)
PHOSPHORUS: 0.8 MG/DL (ref 2.5–4.5)
PHOSPHORUS: 1.5 MG/DL (ref 2.5–4.5)
PHOSPHORUS: 1.6 MG/DL (ref 2.5–4.5)
POTASSIUM SERPL-SCNC: 3.3 MEQ/L (ref 3.5–5.1)
POTASSIUM SERPL-SCNC: 3.4 MEQ/L (ref 3.5–5.1)
POTASSIUM SERPL-SCNC: 3.5 MEQ/L (ref 3.5–5.1)
PROTEIN UA: 30 MG/DL
RBC UA: ABNORMAL /HPF (ref 0–2)
SODIUM BLD-SCNC: 134 MEQ/L (ref 132–144)
SODIUM BLD-SCNC: 135 MEQ/L (ref 132–144)
SODIUM BLD-SCNC: 137 MEQ/L (ref 132–144)
SPECIFIC GRAVITY UA: 1.01 (ref 1–1.03)
UROBILINOGEN, URINE: 0.2 E.U./DL
WBC UA: ABNORMAL /HPF (ref 0–5)

## 2018-05-28 PROCEDURE — 6360000002 HC RX W HCPCS: Performed by: INTERNAL MEDICINE

## 2018-05-28 PROCEDURE — 6370000000 HC RX 637 (ALT 250 FOR IP): Performed by: INTERNAL MEDICINE

## 2018-05-28 PROCEDURE — 84100 ASSAY OF PHOSPHORUS: CPT

## 2018-05-28 PROCEDURE — 6360000002 HC RX W HCPCS: Performed by: PHYSICIAN ASSISTANT

## 2018-05-28 PROCEDURE — 99232 SBSQ HOSP IP/OBS MODERATE 35: CPT | Performed by: INTERNAL MEDICINE

## 2018-05-28 PROCEDURE — 80048 BASIC METABOLIC PNL TOTAL CA: CPT

## 2018-05-28 PROCEDURE — 2580000003 HC RX 258: Performed by: INTERNAL MEDICINE

## 2018-05-28 PROCEDURE — 2000000000 HC ICU R&B

## 2018-05-28 PROCEDURE — 2500000003 HC RX 250 WO HCPCS: Performed by: INTERNAL MEDICINE

## 2018-05-28 PROCEDURE — 36415 COLL VENOUS BLD VENIPUNCTURE: CPT

## 2018-05-28 PROCEDURE — 83735 ASSAY OF MAGNESIUM: CPT

## 2018-05-28 PROCEDURE — 81001 URINALYSIS AUTO W/SCOPE: CPT

## 2018-05-28 RX ORDER — INSULIN GLARGINE 100 [IU]/ML
15 INJECTION, SOLUTION SUBCUTANEOUS 2 TIMES DAILY
Status: DISCONTINUED | OUTPATIENT
Start: 2018-05-28 | End: 2018-05-29

## 2018-05-28 RX ORDER — DEXTROSE MONOHYDRATE 50 MG/ML
100 INJECTION, SOLUTION INTRAVENOUS PRN
Status: DISCONTINUED | OUTPATIENT
Start: 2018-05-28 | End: 2018-06-01 | Stop reason: HOSPADM

## 2018-05-28 RX ORDER — NICOTINE POLACRILEX 4 MG
15 LOZENGE BUCCAL PRN
Status: DISCONTINUED | OUTPATIENT
Start: 2018-05-28 | End: 2018-05-31

## 2018-05-28 RX ORDER — SODIUM CHLORIDE 9 MG/ML
INJECTION, SOLUTION INTRAVENOUS CONTINUOUS
Status: DISCONTINUED | OUTPATIENT
Start: 2018-05-28 | End: 2018-06-01 | Stop reason: HOSPADM

## 2018-05-28 RX ORDER — DEXTROSE MONOHYDRATE 25 G/50ML
12.5 INJECTION, SOLUTION INTRAVENOUS PRN
Status: DISCONTINUED | OUTPATIENT
Start: 2018-05-28 | End: 2018-06-01 | Stop reason: HOSPADM

## 2018-05-28 RX ADMIN — ASPIRIN 81 MG 81 MG: 81 TABLET ORAL at 08:31

## 2018-05-28 RX ADMIN — SODIUM PHOSPHATE, MONOBASIC, MONOHYDRATE 15 MMOL: 276; 142 INJECTION, SOLUTION INTRAVENOUS at 21:34

## 2018-05-28 RX ADMIN — ENOXAPARIN SODIUM 40 MG: 100 INJECTION SUBCUTANEOUS at 08:31

## 2018-05-28 RX ADMIN — POTASSIUM CHLORIDE 10 MEQ: 7.46 INJECTION, SOLUTION INTRAVENOUS at 23:36

## 2018-05-28 RX ADMIN — POTASSIUM CHLORIDE 10 MEQ: 7.46 INJECTION, SOLUTION INTRAVENOUS at 03:10

## 2018-05-28 RX ADMIN — POTASSIUM CHLORIDE 10 MEQ: 7.46 INJECTION, SOLUTION INTRAVENOUS at 21:27

## 2018-05-28 RX ADMIN — POTASSIUM CHLORIDE 10 MEQ: 7.46 INJECTION, SOLUTION INTRAVENOUS at 02:12

## 2018-05-28 RX ADMIN — POTASSIUM CHLORIDE 10 MEQ: 7.46 INJECTION, SOLUTION INTRAVENOUS at 04:15

## 2018-05-28 RX ADMIN — INSULIN LISPRO 1 UNITS: 100 INJECTION, SOLUTION INTRAVENOUS; SUBCUTANEOUS at 18:30

## 2018-05-28 RX ADMIN — SODIUM CHLORIDE 21 UNITS/HR: 9 INJECTION, SOLUTION INTRAVENOUS at 09:31

## 2018-05-28 RX ADMIN — CARVEDILOL 25 MG: 25 TABLET, FILM COATED ORAL at 17:33

## 2018-05-28 RX ADMIN — SODIUM PHOSPHATE, MONOBASIC, MONOHYDRATE 15 MMOL: 276; 142 INJECTION, SOLUTION INTRAVENOUS at 12:13

## 2018-05-28 RX ADMIN — POTASSIUM CHLORIDE 10 MEQ: 7.46 INJECTION, SOLUTION INTRAVENOUS at 13:03

## 2018-05-28 RX ADMIN — SODIUM CHLORIDE 15.4 UNITS/HR: 9 INJECTION, SOLUTION INTRAVENOUS at 03:17

## 2018-05-28 RX ADMIN — SODIUM PHOSPHATE, MONOBASIC, MONOHYDRATE 20 MMOL: 276; 142 INJECTION, SOLUTION INTRAVENOUS at 02:10

## 2018-05-28 RX ADMIN — POTASSIUM CHLORIDE 10 MEQ: 7.46 INJECTION, SOLUTION INTRAVENOUS at 10:50

## 2018-05-28 RX ADMIN — INSULIN GLARGINE 15 UNITS: 100 INJECTION, SOLUTION SUBCUTANEOUS at 21:28

## 2018-05-28 RX ADMIN — ONDANSETRON 4 MG: 2 INJECTION INTRAMUSCULAR; INTRAVENOUS at 06:29

## 2018-05-28 RX ADMIN — POTASSIUM CHLORIDE 10 MEQ: 7.46 INJECTION, SOLUTION INTRAVENOUS at 22:35

## 2018-05-28 RX ADMIN — DEXTROSE AND SODIUM CHLORIDE: 5; 450 INJECTION, SOLUTION INTRAVENOUS at 13:16

## 2018-05-28 RX ADMIN — DEXTROSE AND SODIUM CHLORIDE: 5; 450 INJECTION, SOLUTION INTRAVENOUS at 06:32

## 2018-05-28 RX ADMIN — SODIUM CHLORIDE 14.04 UNITS/HR: 9 INJECTION, SOLUTION INTRAVENOUS at 14:12

## 2018-05-28 RX ADMIN — SODIUM CHLORIDE: 9 INJECTION, SOLUTION INTRAVENOUS at 17:33

## 2018-05-28 RX ADMIN — CARVEDILOL 25 MG: 25 TABLET, FILM COATED ORAL at 08:31

## 2018-05-28 RX ADMIN — POTASSIUM CHLORIDE 10 MEQ: 7.46 INJECTION, SOLUTION INTRAVENOUS at 12:00

## 2018-05-28 RX ADMIN — POTASSIUM CHLORIDE 10 MEQ: 7.46 INJECTION, SOLUTION INTRAVENOUS at 14:12

## 2018-05-28 ASSESSMENT — PAIN SCALES - GENERAL
PAINLEVEL_OUTOF10: 9
PAINLEVEL_OUTOF10: 0
PAINLEVEL_OUTOF10: 0

## 2018-05-28 ASSESSMENT — ENCOUNTER SYMPTOMS
DIARRHEA: 0
VOICE CHANGE: 0
BLOOD IN STOOL: 0
NAUSEA: 0
ANAL BLEEDING: 0
VOMITING: 0
CHEST TIGHTNESS: 0
APNEA: 0
SHORTNESS OF BREATH: 0
COUGH: 0
TROUBLE SWALLOWING: 0
COLOR CHANGE: 0
ABDOMINAL DISTENTION: 0
NAUSEA: 1
WHEEZING: 0
FACIAL SWELLING: 0

## 2018-05-28 ASSESSMENT — PAIN DESCRIPTION - PAIN TYPE: TYPE: ACUTE PAIN

## 2018-05-28 ASSESSMENT — PAIN DESCRIPTION - ORIENTATION: ORIENTATION: LEFT

## 2018-05-28 ASSESSMENT — PAIN DESCRIPTION - FREQUENCY: FREQUENCY: INTERMITTENT

## 2018-05-28 ASSESSMENT — PAIN DESCRIPTION - LOCATION: LOCATION: ABDOMEN

## 2018-05-29 LAB
ANION GAP SERPL CALCULATED.3IONS-SCNC: 14 MEQ/L (ref 7–13)
ANION GAP SERPL CALCULATED.3IONS-SCNC: 25 MEQ/L (ref 7–13)
BUN BLDV-MCNC: 11 MG/DL (ref 6–20)
BUN BLDV-MCNC: 9 MG/DL (ref 6–20)
CALCIUM SERPL-MCNC: 7.7 MG/DL (ref 8.6–10.2)
CALCIUM SERPL-MCNC: 7.8 MG/DL (ref 8.6–10.2)
CHLORIDE BLD-SCNC: 106 MEQ/L (ref 98–107)
CHLORIDE BLD-SCNC: 96 MEQ/L (ref 98–107)
CO2: 12 MEQ/L (ref 22–29)
CO2: 20 MEQ/L (ref 22–29)
CREAT SERPL-MCNC: 0.56 MG/DL (ref 0.5–0.9)
CREAT SERPL-MCNC: 0.73 MG/DL (ref 0.5–0.9)
GFR AFRICAN AMERICAN: >60
GFR AFRICAN AMERICAN: >60
GFR NON-AFRICAN AMERICAN: >60
GFR NON-AFRICAN AMERICAN: >60
GLUCOSE BLD-MCNC: 118 MG/DL (ref 60–115)
GLUCOSE BLD-MCNC: 123 MG/DL (ref 60–115)
GLUCOSE BLD-MCNC: 136 MG/DL (ref 74–109)
GLUCOSE BLD-MCNC: 137 MG/DL (ref 60–115)
GLUCOSE BLD-MCNC: 143 MG/DL (ref 60–115)
GLUCOSE BLD-MCNC: 160 MG/DL (ref 60–115)
GLUCOSE BLD-MCNC: 181 MG/DL (ref 60–115)
GLUCOSE BLD-MCNC: 183 MG/DL (ref 60–115)
GLUCOSE BLD-MCNC: 232 MG/DL (ref 60–115)
GLUCOSE BLD-MCNC: 293 MG/DL (ref 60–115)
GLUCOSE BLD-MCNC: 362 MG/DL (ref 60–115)
GLUCOSE BLD-MCNC: 369 MG/DL (ref 60–115)
GLUCOSE BLD-MCNC: 442 MG/DL (ref 60–115)
GLUCOSE BLD-MCNC: 456 MG/DL (ref 60–115)
GLUCOSE BLD-MCNC: 504 MG/DL (ref 74–109)
GLUCOSE BLD-MCNC: >600 MG/DL (ref 60–115)
LIPASE: 165 U/L (ref 13–60)
PERFORMED ON: ABNORMAL
POTASSIUM SERPL-SCNC: 3.4 MEQ/L (ref 3.5–5.1)
POTASSIUM SERPL-SCNC: 4.2 MEQ/L (ref 3.5–5.1)
SODIUM BLD-SCNC: 133 MEQ/L (ref 132–144)
SODIUM BLD-SCNC: 140 MEQ/L (ref 132–144)

## 2018-05-29 PROCEDURE — 6360000002 HC RX W HCPCS: Performed by: INTERNAL MEDICINE

## 2018-05-29 PROCEDURE — 36415 COLL VENOUS BLD VENIPUNCTURE: CPT

## 2018-05-29 PROCEDURE — APPNB15 APP NON BILLABLE TIME 0-15 MINS: Performed by: NURSE PRACTITIONER

## 2018-05-29 PROCEDURE — 2580000003 HC RX 258: Performed by: INTERNAL MEDICINE

## 2018-05-29 PROCEDURE — 99231 SBSQ HOSP IP/OBS SF/LOW 25: CPT | Performed by: PHYSICIAN ASSISTANT

## 2018-05-29 PROCEDURE — 1210000000 HC MED SURG R&B

## 2018-05-29 PROCEDURE — 6370000000 HC RX 637 (ALT 250 FOR IP): Performed by: INTERNAL MEDICINE

## 2018-05-29 PROCEDURE — 99232 SBSQ HOSP IP/OBS MODERATE 35: CPT | Performed by: INTERNAL MEDICINE

## 2018-05-29 PROCEDURE — 6360000002 HC RX W HCPCS: Performed by: NURSE PRACTITIONER

## 2018-05-29 PROCEDURE — 80048 BASIC METABOLIC PNL TOTAL CA: CPT

## 2018-05-29 PROCEDURE — 83690 ASSAY OF LIPASE: CPT

## 2018-05-29 RX ORDER — POTASSIUM CHLORIDE 1.5 G/1.77G
20 POWDER, FOR SOLUTION ORAL ONCE
Status: DISCONTINUED | OUTPATIENT
Start: 2018-05-29 | End: 2018-06-01 | Stop reason: HOSPADM

## 2018-05-29 RX ORDER — MAGNESIUM SULFATE IN WATER 40 MG/ML
2 INJECTION, SOLUTION INTRAVENOUS ONCE
Status: COMPLETED | OUTPATIENT
Start: 2018-05-29 | End: 2018-05-29

## 2018-05-29 RX ORDER — LISINOPRIL 2.5 MG/1
2.5 TABLET ORAL DAILY
Status: DISCONTINUED | OUTPATIENT
Start: 2018-05-29 | End: 2018-06-01 | Stop reason: HOSPADM

## 2018-05-29 RX ORDER — DEXTROSE MONOHYDRATE 25 G/50ML
12.5 INJECTION, SOLUTION INTRAVENOUS PRN
Status: DISCONTINUED | OUTPATIENT
Start: 2018-05-29 | End: 2018-05-29 | Stop reason: SDUPTHER

## 2018-05-29 RX ORDER — INSULIN GLARGINE 100 [IU]/ML
25 INJECTION, SOLUTION SUBCUTANEOUS 2 TIMES DAILY
Status: DISCONTINUED | OUTPATIENT
Start: 2018-05-29 | End: 2018-05-29

## 2018-05-29 RX ORDER — INSULIN GLARGINE 100 [IU]/ML
20 INJECTION, SOLUTION SUBCUTANEOUS 2 TIMES DAILY
Status: DISCONTINUED | OUTPATIENT
Start: 2018-05-29 | End: 2018-05-29

## 2018-05-29 RX ORDER — ATORVASTATIN CALCIUM 20 MG/1
20 TABLET, FILM COATED ORAL NIGHTLY
Status: DISCONTINUED | OUTPATIENT
Start: 2018-05-29 | End: 2018-06-01 | Stop reason: HOSPADM

## 2018-05-29 RX ORDER — NICOTINE POLACRILEX 4 MG
15 LOZENGE BUCCAL PRN
Status: DISCONTINUED | OUTPATIENT
Start: 2018-05-29 | End: 2018-06-01 | Stop reason: HOSPADM

## 2018-05-29 RX ORDER — DEXTROSE MONOHYDRATE 50 MG/ML
100 INJECTION, SOLUTION INTRAVENOUS PRN
Status: DISCONTINUED | OUTPATIENT
Start: 2018-05-29 | End: 2018-05-29 | Stop reason: SDUPTHER

## 2018-05-29 RX ORDER — ACETAMINOPHEN 325 MG/1
650 TABLET ORAL EVERY 4 HOURS PRN
Status: DISCONTINUED | OUTPATIENT
Start: 2018-05-29 | End: 2018-06-01 | Stop reason: HOSPADM

## 2018-05-29 RX ADMIN — ASPIRIN 81 MG 81 MG: 81 TABLET ORAL at 07:38

## 2018-05-29 RX ADMIN — SODIUM CHLORIDE: 9 INJECTION, SOLUTION INTRAVENOUS at 06:24

## 2018-05-29 RX ADMIN — INSULIN GLARGINE 25 UNITS: 100 INJECTION, SOLUTION SUBCUTANEOUS at 08:25

## 2018-05-29 RX ADMIN — SODIUM CHLORIDE 4.4 UNITS/HR: 9 INJECTION, SOLUTION INTRAVENOUS at 20:18

## 2018-05-29 RX ADMIN — CARVEDILOL 25 MG: 25 TABLET, FILM COATED ORAL at 17:05

## 2018-05-29 RX ADMIN — CARVEDILOL 25 MG: 25 TABLET, FILM COATED ORAL at 07:38

## 2018-05-29 RX ADMIN — POTASSIUM CHLORIDE 10 MEQ: 7.46 INJECTION, SOLUTION INTRAVENOUS at 21:39

## 2018-05-29 RX ADMIN — ENOXAPARIN SODIUM 40 MG: 100 INJECTION SUBCUTANEOUS at 07:37

## 2018-05-29 RX ADMIN — SODIUM CHLORIDE 9.06 UNITS/HR: 9 INJECTION, SOLUTION INTRAVENOUS at 11:51

## 2018-05-29 RX ADMIN — ATORVASTATIN CALCIUM 20 MG: 20 TABLET, FILM COATED ORAL at 20:24

## 2018-05-29 RX ADMIN — MAGNESIUM SULFATE HEPTAHYDRATE 2 G: 40 INJECTION, SOLUTION INTRAVENOUS at 09:21

## 2018-05-29 RX ADMIN — LISINOPRIL 2.5 MG: 2.5 TABLET ORAL at 20:24

## 2018-05-29 RX ADMIN — SODIUM CHLORIDE: 9 INJECTION, SOLUTION INTRAVENOUS at 17:05

## 2018-05-29 RX ADMIN — CEFTRIAXONE SODIUM 1 G: 1 INJECTION, POWDER, FOR SOLUTION INTRAMUSCULAR; INTRAVENOUS at 00:42

## 2018-05-29 RX ADMIN — INSULIN LISPRO 6 UNITS: 100 INJECTION, SOLUTION INTRAVENOUS; SUBCUTANEOUS at 07:37

## 2018-05-29 ASSESSMENT — PAIN SCALES - GENERAL
PAINLEVEL_OUTOF10: 0

## 2018-05-29 ASSESSMENT — ENCOUNTER SYMPTOMS: SHORTNESS OF BREATH: 0

## 2018-05-30 LAB
ANION GAP SERPL CALCULATED.3IONS-SCNC: 14 MEQ/L (ref 7–13)
ANION GAP SERPL CALCULATED.3IONS-SCNC: 16 MEQ/L (ref 7–13)
ANION GAP SERPL CALCULATED.3IONS-SCNC: 16 MEQ/L (ref 7–13)
BUN BLDV-MCNC: 6 MG/DL (ref 6–20)
BUN BLDV-MCNC: 7 MG/DL (ref 6–20)
BUN BLDV-MCNC: 7 MG/DL (ref 6–20)
CALCIUM SERPL-MCNC: 7.7 MG/DL (ref 8.6–10.2)
CALCIUM SERPL-MCNC: 7.8 MG/DL (ref 8.6–10.2)
CALCIUM SERPL-MCNC: 8.1 MG/DL (ref 8.6–10.2)
CHLORIDE BLD-SCNC: 100 MEQ/L (ref 98–107)
CHLORIDE BLD-SCNC: 104 MEQ/L (ref 98–107)
CHLORIDE BLD-SCNC: 104 MEQ/L (ref 98–107)
CO2: 18 MEQ/L (ref 22–29)
CO2: 19 MEQ/L (ref 22–29)
CO2: 19 MEQ/L (ref 22–29)
CREAT SERPL-MCNC: 0.53 MG/DL (ref 0.5–0.9)
CREAT SERPL-MCNC: 0.55 MG/DL (ref 0.5–0.9)
CREAT SERPL-MCNC: 0.56 MG/DL (ref 0.5–0.9)
GFR AFRICAN AMERICAN: >60
GFR NON-AFRICAN AMERICAN: >60
GLUCOSE BLD-MCNC: 119 MG/DL (ref 60–115)
GLUCOSE BLD-MCNC: 123 MG/DL (ref 60–115)
GLUCOSE BLD-MCNC: 124 MG/DL (ref 60–115)
GLUCOSE BLD-MCNC: 126 MG/DL (ref 60–115)
GLUCOSE BLD-MCNC: 129 MG/DL (ref 60–115)
GLUCOSE BLD-MCNC: 131 MG/DL (ref 60–115)
GLUCOSE BLD-MCNC: 135 MG/DL (ref 60–115)
GLUCOSE BLD-MCNC: 138 MG/DL (ref 60–115)
GLUCOSE BLD-MCNC: 141 MG/DL (ref 60–115)
GLUCOSE BLD-MCNC: 145 MG/DL (ref 74–109)
GLUCOSE BLD-MCNC: 149 MG/DL (ref 60–115)
GLUCOSE BLD-MCNC: 160 MG/DL (ref 60–115)
GLUCOSE BLD-MCNC: 164 MG/DL (ref 60–115)
GLUCOSE BLD-MCNC: 167 MG/DL (ref 60–115)
GLUCOSE BLD-MCNC: 170 MG/DL (ref 60–115)
GLUCOSE BLD-MCNC: 175 MG/DL (ref 60–115)
GLUCOSE BLD-MCNC: 190 MG/DL (ref 60–115)
GLUCOSE BLD-MCNC: 192 MG/DL (ref 60–115)
GLUCOSE BLD-MCNC: 195 MG/DL (ref 60–115)
GLUCOSE BLD-MCNC: 198 MG/DL (ref 74–109)
GLUCOSE BLD-MCNC: 219 MG/DL (ref 60–115)
GLUCOSE BLD-MCNC: 253 MG/DL (ref 60–115)
GLUCOSE BLD-MCNC: 287 MG/DL (ref 74–109)
GLUCOSE BLD-MCNC: 290 MG/DL (ref 60–115)
GLUCOSE BLD-MCNC: 318 MG/DL (ref 60–115)
MAGNESIUM: 2.3 MG/DL (ref 1.7–2.3)
PERFORMED ON: ABNORMAL
POTASSIUM SERPL-SCNC: 3.2 MEQ/L (ref 3.5–5.1)
POTASSIUM SERPL-SCNC: 3.4 MEQ/L (ref 3.5–5.1)
POTASSIUM SERPL-SCNC: 3.5 MEQ/L (ref 3.5–5.1)
SODIUM BLD-SCNC: 135 MEQ/L (ref 132–144)
SODIUM BLD-SCNC: 137 MEQ/L (ref 132–144)
SODIUM BLD-SCNC: 138 MEQ/L (ref 132–144)

## 2018-05-30 PROCEDURE — 6370000000 HC RX 637 (ALT 250 FOR IP): Performed by: INTERNAL MEDICINE

## 2018-05-30 PROCEDURE — 83735 ASSAY OF MAGNESIUM: CPT

## 2018-05-30 PROCEDURE — 2580000003 HC RX 258: Performed by: INTERNAL MEDICINE

## 2018-05-30 PROCEDURE — 93010 ELECTROCARDIOGRAM REPORT: CPT | Performed by: INTERNAL MEDICINE

## 2018-05-30 PROCEDURE — 6360000002 HC RX W HCPCS: Performed by: INTERNAL MEDICINE

## 2018-05-30 PROCEDURE — 1210000000 HC MED SURG R&B

## 2018-05-30 PROCEDURE — 36415 COLL VENOUS BLD VENIPUNCTURE: CPT

## 2018-05-30 PROCEDURE — 99231 SBSQ HOSP IP/OBS SF/LOW 25: CPT | Performed by: PHYSICIAN ASSISTANT

## 2018-05-30 PROCEDURE — 99232 SBSQ HOSP IP/OBS MODERATE 35: CPT | Performed by: INTERNAL MEDICINE

## 2018-05-30 PROCEDURE — 80048 BASIC METABOLIC PNL TOTAL CA: CPT

## 2018-05-30 PROCEDURE — 94664 DEMO&/EVAL PT USE INHALER: CPT

## 2018-05-30 PROCEDURE — G0108 DIAB MANAGE TRN  PER INDIV: HCPCS

## 2018-05-30 RX ORDER — POTASSIUM CHLORIDE 1.5 G/1.77G
20 POWDER, FOR SOLUTION ORAL ONCE
Status: COMPLETED | OUTPATIENT
Start: 2018-05-30 | End: 2018-05-30

## 2018-05-30 RX ADMIN — CARVEDILOL 25 MG: 25 TABLET, FILM COATED ORAL at 08:32

## 2018-05-30 RX ADMIN — CEFTRIAXONE SODIUM 1 G: 1 INJECTION, POWDER, FOR SOLUTION INTRAMUSCULAR; INTRAVENOUS at 02:10

## 2018-05-30 RX ADMIN — ATORVASTATIN CALCIUM 20 MG: 20 TABLET, FILM COATED ORAL at 21:32

## 2018-05-30 RX ADMIN — ASPIRIN 81 MG 81 MG: 81 TABLET ORAL at 08:31

## 2018-05-30 RX ADMIN — SODIUM CHLORIDE: 9 INJECTION, SOLUTION INTRAVENOUS at 23:43

## 2018-05-30 RX ADMIN — POTASSIUM CHLORIDE 10 MEQ: 7.46 INJECTION, SOLUTION INTRAVENOUS at 02:13

## 2018-05-30 RX ADMIN — SODIUM CHLORIDE 2.9 UNITS/HR: 9 INJECTION, SOLUTION INTRAVENOUS at 00:45

## 2018-05-30 RX ADMIN — ENOXAPARIN SODIUM 40 MG: 100 INJECTION SUBCUTANEOUS at 08:32

## 2018-05-30 RX ADMIN — CARVEDILOL 25 MG: 25 TABLET, FILM COATED ORAL at 17:35

## 2018-05-30 RX ADMIN — POTASSIUM CHLORIDE 20 MEQ: 1.5 POWDER, FOR SOLUTION ORAL at 14:44

## 2018-05-30 RX ADMIN — POTASSIUM CHLORIDE 10 MEQ: 7.46 INJECTION, SOLUTION INTRAVENOUS at 00:43

## 2018-05-30 RX ADMIN — LISINOPRIL 2.5 MG: 2.5 TABLET ORAL at 08:31

## 2018-05-30 RX ADMIN — SODIUM CHLORIDE 11.58 UNITS/HR: 9 INJECTION, SOLUTION INTRAVENOUS at 18:43

## 2018-05-30 RX ADMIN — SODIUM CHLORIDE: 9 INJECTION, SOLUTION INTRAVENOUS at 04:23

## 2018-05-30 ASSESSMENT — ENCOUNTER SYMPTOMS
NAUSEA: 0
CHEST TIGHTNESS: 0
GASTROINTESTINAL NEGATIVE: 1
RESPIRATORY NEGATIVE: 1
STRIDOR: 0
BLOOD IN STOOL: 0
SHORTNESS OF BREATH: 0
WHEEZING: 0
COUGH: 0
EYES NEGATIVE: 1

## 2018-05-30 ASSESSMENT — PAIN SCALES - GENERAL: PAINLEVEL_OUTOF10: 0

## 2018-05-31 LAB
ANION GAP SERPL CALCULATED.3IONS-SCNC: 14 MEQ/L (ref 7–13)
BUN BLDV-MCNC: 5 MG/DL (ref 6–20)
CALCIUM SERPL-MCNC: 7.8 MG/DL (ref 8.6–10.2)
CHLORIDE BLD-SCNC: 108 MEQ/L (ref 98–107)
CO2: 20 MEQ/L (ref 22–29)
CREAT SERPL-MCNC: 0.57 MG/DL (ref 0.5–0.9)
GFR AFRICAN AMERICAN: >60
GFR NON-AFRICAN AMERICAN: >60
GLUCOSE BLD-MCNC: 118 MG/DL (ref 60–115)
GLUCOSE BLD-MCNC: 131 MG/DL (ref 60–115)
GLUCOSE BLD-MCNC: 138 MG/DL (ref 74–109)
GLUCOSE BLD-MCNC: 147 MG/DL (ref 60–115)
GLUCOSE BLD-MCNC: 150 MG/DL (ref 60–115)
GLUCOSE BLD-MCNC: 159 MG/DL (ref 60–115)
GLUCOSE BLD-MCNC: 167 MG/DL (ref 60–115)
GLUCOSE BLD-MCNC: 171 MG/DL (ref 60–115)
GLUCOSE BLD-MCNC: 172 MG/DL (ref 60–115)
GLUCOSE BLD-MCNC: 172 MG/DL (ref 60–115)
GLUCOSE BLD-MCNC: 175 MG/DL (ref 60–115)
GLUCOSE BLD-MCNC: 184 MG/DL (ref 60–115)
GLUCOSE BLD-MCNC: 202 MG/DL (ref 60–115)
GLUCOSE BLD-MCNC: 204 MG/DL (ref 60–115)
GLUCOSE BLD-MCNC: 204 MG/DL (ref 60–115)
GLUCOSE BLD-MCNC: 205 MG/DL (ref 60–115)
GLUCOSE BLD-MCNC: 217 MG/DL (ref 60–115)
MAGNESIUM: 2.1 MG/DL (ref 1.7–2.3)
PERFORMED ON: ABNORMAL
POTASSIUM SERPL-SCNC: 3.4 MEQ/L (ref 3.5–5.1)
SODIUM BLD-SCNC: 142 MEQ/L (ref 132–144)

## 2018-05-31 PROCEDURE — 1210000000 HC MED SURG R&B

## 2018-05-31 PROCEDURE — 36415 COLL VENOUS BLD VENIPUNCTURE: CPT

## 2018-05-31 PROCEDURE — 6370000000 HC RX 637 (ALT 250 FOR IP): Performed by: INTERNAL MEDICINE

## 2018-05-31 PROCEDURE — 99232 SBSQ HOSP IP/OBS MODERATE 35: CPT | Performed by: INTERNAL MEDICINE

## 2018-05-31 PROCEDURE — 2580000003 HC RX 258: Performed by: INTERNAL MEDICINE

## 2018-05-31 PROCEDURE — 6360000002 HC RX W HCPCS: Performed by: INTERNAL MEDICINE

## 2018-05-31 PROCEDURE — 80048 BASIC METABOLIC PNL TOTAL CA: CPT

## 2018-05-31 PROCEDURE — 99231 SBSQ HOSP IP/OBS SF/LOW 25: CPT | Performed by: PHYSICIAN ASSISTANT

## 2018-05-31 PROCEDURE — 83735 ASSAY OF MAGNESIUM: CPT

## 2018-05-31 RX ORDER — LISINOPRIL 2.5 MG/1
2.5 TABLET ORAL DAILY
Qty: 30 TABLET | Refills: 3 | Status: SHIPPED | OUTPATIENT
Start: 2018-06-01 | End: 2021-02-24

## 2018-05-31 RX ORDER — ATORVASTATIN CALCIUM 20 MG/1
20 TABLET, FILM COATED ORAL NIGHTLY
Qty: 30 TABLET | Refills: 3 | Status: SHIPPED | OUTPATIENT
Start: 2018-05-31 | End: 2021-02-24

## 2018-05-31 RX ORDER — INSULIN GLARGINE 100 [IU]/ML
30 INJECTION, SOLUTION SUBCUTANEOUS NIGHTLY
Status: DISCONTINUED | OUTPATIENT
Start: 2018-05-31 | End: 2018-06-01 | Stop reason: HOSPADM

## 2018-05-31 RX ORDER — POTASSIUM CHLORIDE 1.5 G/1.77G
40 POWDER, FOR SOLUTION ORAL ONCE
Status: COMPLETED | OUTPATIENT
Start: 2018-05-31 | End: 2018-05-31

## 2018-05-31 RX ORDER — INSULIN GLARGINE 100 [IU]/ML
25 INJECTION, SOLUTION SUBCUTANEOUS NIGHTLY
Status: DISCONTINUED | OUTPATIENT
Start: 2018-05-31 | End: 2018-05-31

## 2018-05-31 RX ORDER — INSULIN GLARGINE 100 [IU]/ML
20 INJECTION, SOLUTION SUBCUTANEOUS NIGHTLY
Status: DISCONTINUED | OUTPATIENT
Start: 2018-05-31 | End: 2018-05-31

## 2018-05-31 RX ADMIN — SODIUM CHLORIDE: 9 INJECTION, SOLUTION INTRAVENOUS at 20:44

## 2018-05-31 RX ADMIN — ENOXAPARIN SODIUM 40 MG: 100 INJECTION SUBCUTANEOUS at 09:12

## 2018-05-31 RX ADMIN — ATORVASTATIN CALCIUM 20 MG: 20 TABLET, FILM COATED ORAL at 20:47

## 2018-05-31 RX ADMIN — CEFTRIAXONE SODIUM 1 G: 1 INJECTION, POWDER, FOR SOLUTION INTRAMUSCULAR; INTRAVENOUS at 02:49

## 2018-05-31 RX ADMIN — CARVEDILOL 25 MG: 25 TABLET, FILM COATED ORAL at 17:30

## 2018-05-31 RX ADMIN — METFORMIN HYDROCHLORIDE 500 MG: 500 TABLET, FILM COATED ORAL at 17:27

## 2018-05-31 RX ADMIN — POTASSIUM CHLORIDE 40 MEQ: 1.5 POWDER, FOR SOLUTION ORAL at 14:29

## 2018-05-31 RX ADMIN — ASPIRIN 81 MG 81 MG: 81 TABLET ORAL at 09:13

## 2018-05-31 RX ADMIN — CARVEDILOL 25 MG: 25 TABLET, FILM COATED ORAL at 09:13

## 2018-05-31 RX ADMIN — LISINOPRIL 2.5 MG: 2.5 TABLET ORAL at 09:13

## 2018-05-31 RX ADMIN — SODIUM CHLORIDE 8.7 UNITS/HR: 9 INJECTION, SOLUTION INTRAVENOUS at 13:44

## 2018-05-31 RX ADMIN — INSULIN GLARGINE 30 UNITS: 100 INJECTION, SOLUTION SUBCUTANEOUS at 22:17

## 2018-05-31 RX ADMIN — SODIUM CHLORIDE: 9 INJECTION, SOLUTION INTRAVENOUS at 10:49

## 2018-05-31 ASSESSMENT — PAIN SCALES - GENERAL
PAINLEVEL_OUTOF10: 0
PAINLEVEL_OUTOF10: 0

## 2018-05-31 ASSESSMENT — ENCOUNTER SYMPTOMS
WHEEZING: 0
EYES NEGATIVE: 1
GASTROINTESTINAL NEGATIVE: 1
BLOOD IN STOOL: 0
RESPIRATORY NEGATIVE: 1
CHEST TIGHTNESS: 0
STRIDOR: 0
SHORTNESS OF BREATH: 0
NAUSEA: 0
COUGH: 0

## 2018-06-01 VITALS
TEMPERATURE: 98.2 F | OXYGEN SATURATION: 100 % | WEIGHT: 255.29 LBS | HEART RATE: 85 BPM | HEIGHT: 64 IN | RESPIRATION RATE: 20 BRPM | DIASTOLIC BLOOD PRESSURE: 75 MMHG | BODY MASS INDEX: 43.58 KG/M2 | SYSTOLIC BLOOD PRESSURE: 140 MMHG

## 2018-06-01 LAB
ANION GAP SERPL CALCULATED.3IONS-SCNC: 15 MEQ/L (ref 7–13)
BUN BLDV-MCNC: 5 MG/DL (ref 6–20)
CALCIUM SERPL-MCNC: 8.2 MG/DL (ref 8.6–10.2)
CHLORIDE BLD-SCNC: 107 MEQ/L (ref 98–107)
CO2: 20 MEQ/L (ref 22–29)
CREAT SERPL-MCNC: 0.49 MG/DL (ref 0.5–0.9)
GFR AFRICAN AMERICAN: >60
GFR NON-AFRICAN AMERICAN: >60
GLUCOSE BLD-MCNC: 163 MG/DL (ref 60–115)
GLUCOSE BLD-MCNC: 220 MG/DL (ref 74–109)
GLUCOSE BLD-MCNC: 268 MG/DL (ref 60–115)
GLUCOSE BLD-MCNC: 268 MG/DL (ref 60–115)
HCT VFR BLD CALC: 34.4 % (ref 37–47)
HEMOGLOBIN: 11.5 G/DL (ref 12–16)
LIPASE: 142 U/L (ref 13–60)
MAGNESIUM: 1.9 MG/DL (ref 1.7–2.3)
MCH RBC QN AUTO: 32.1 PG (ref 27–31.3)
MCHC RBC AUTO-ENTMCNC: 33.4 % (ref 33–37)
MCV RBC AUTO: 96.3 FL (ref 82–100)
PDW BLD-RTO: 13.7 % (ref 11.5–14.5)
PERFORMED ON: ABNORMAL
PLATELET # BLD: 209 K/UL (ref 130–400)
POTASSIUM SERPL-SCNC: 4 MEQ/L (ref 3.5–5.1)
RBC # BLD: 3.57 M/UL (ref 4.2–5.4)
SODIUM BLD-SCNC: 142 MEQ/L (ref 132–144)
WBC # BLD: 6.2 K/UL (ref 4.8–10.8)

## 2018-06-01 PROCEDURE — 6370000000 HC RX 637 (ALT 250 FOR IP): Performed by: INTERNAL MEDICINE

## 2018-06-01 PROCEDURE — 83690 ASSAY OF LIPASE: CPT

## 2018-06-01 PROCEDURE — 6360000002 HC RX W HCPCS: Performed by: INTERNAL MEDICINE

## 2018-06-01 PROCEDURE — 83735 ASSAY OF MAGNESIUM: CPT

## 2018-06-01 PROCEDURE — 99231 SBSQ HOSP IP/OBS SF/LOW 25: CPT | Performed by: INTERNAL MEDICINE

## 2018-06-01 PROCEDURE — 85027 COMPLETE CBC AUTOMATED: CPT

## 2018-06-01 PROCEDURE — 99231 SBSQ HOSP IP/OBS SF/LOW 25: CPT | Performed by: PHYSICIAN ASSISTANT

## 2018-06-01 PROCEDURE — 36415 COLL VENOUS BLD VENIPUNCTURE: CPT

## 2018-06-01 PROCEDURE — 80048 BASIC METABOLIC PNL TOTAL CA: CPT

## 2018-06-01 PROCEDURE — 2580000003 HC RX 258: Performed by: INTERNAL MEDICINE

## 2018-06-01 RX ORDER — IBUPROFEN 200 MG
1 TABLET ORAL
Qty: 100 EACH | Refills: 3 | Status: SHIPPED | OUTPATIENT
Start: 2018-06-01

## 2018-06-01 RX ORDER — LORATADINE 5 MG/5 ML
1 SOLUTION, ORAL ORAL
Qty: 150 EACH | Refills: 0 | Status: SHIPPED | OUTPATIENT
Start: 2018-06-01

## 2018-06-01 RX ORDER — BLOOD-GLUCOSE METER
KIT MISCELLANEOUS
Qty: 1 KIT | Refills: 0 | Status: SHIPPED | OUTPATIENT
Start: 2018-06-01

## 2018-06-01 RX ADMIN — ENOXAPARIN SODIUM 40 MG: 100 INJECTION SUBCUTANEOUS at 08:10

## 2018-06-01 RX ADMIN — ASPIRIN 81 MG 81 MG: 81 TABLET ORAL at 08:10

## 2018-06-01 RX ADMIN — CEFTRIAXONE SODIUM 1 G: 1 INJECTION, POWDER, FOR SOLUTION INTRAMUSCULAR; INTRAVENOUS at 01:52

## 2018-06-01 RX ADMIN — INSULIN HUMAN 15 UNITS: 100 INJECTION, SOLUTION PARENTERAL at 10:31

## 2018-06-01 RX ADMIN — CARVEDILOL 25 MG: 25 TABLET, FILM COATED ORAL at 08:09

## 2018-06-01 RX ADMIN — LISINOPRIL 2.5 MG: 2.5 TABLET ORAL at 08:10

## 2018-06-01 RX ADMIN — METFORMIN HYDROCHLORIDE 500 MG: 500 TABLET, FILM COATED ORAL at 08:09

## 2018-06-01 ASSESSMENT — ENCOUNTER SYMPTOMS
BLOOD IN STOOL: 0
WHEEZING: 0
GASTROINTESTINAL NEGATIVE: 1
CHEST TIGHTNESS: 0
STRIDOR: 0
RESPIRATORY NEGATIVE: 1
SHORTNESS OF BREATH: 0
EYES NEGATIVE: 1
NAUSEA: 0
COUGH: 0

## 2018-06-08 ENCOUNTER — TELEPHONE (OUTPATIENT)
Dept: CARDIOLOGY CLINIC | Age: 43
End: 2018-06-08

## 2018-06-14 ENCOUNTER — OFFICE VISIT (OUTPATIENT)
Dept: ENDOCRINOLOGY | Age: 43
End: 2018-06-14

## 2018-06-14 VITALS
BODY MASS INDEX: 43.19 KG/M2 | WEIGHT: 253 LBS | HEIGHT: 64 IN | DIASTOLIC BLOOD PRESSURE: 78 MMHG | SYSTOLIC BLOOD PRESSURE: 130 MMHG | HEART RATE: 79 BPM

## 2018-06-14 DIAGNOSIS — E83.51 HYPOCALCEMIA: ICD-10-CM

## 2018-06-14 LAB — GLUCOSE BLD-MCNC: 148 MG/DL

## 2018-06-14 PROCEDURE — 99212 OFFICE O/P EST SF 10 MIN: CPT | Performed by: PHYSICIAN ASSISTANT

## 2018-06-14 PROCEDURE — 82962 GLUCOSE BLOOD TEST: CPT | Performed by: PHYSICIAN ASSISTANT

## 2018-06-14 ASSESSMENT — ENCOUNTER SYMPTOMS
DIARRHEA: 0
NAUSEA: 0
SORE THROAT: 0
COUGH: 0
RHINORRHEA: 0
EYE REDNESS: 0
EYE PAIN: 0
SINUS PRESSURE: 0
BLURRED VISION: 1
VOMITING: 0
SHORTNESS OF BREATH: 0
ABDOMINAL PAIN: 0
WHEEZING: 0

## 2018-11-29 RX ORDER — HUMAN INSULIN 100 [IU]/ML
18 INJECTION, SOLUTION SUBCUTANEOUS SEE ADMIN INSTRUCTIONS
Refills: 3 | OUTPATIENT
Start: 2018-11-29

## 2021-02-19 ENCOUNTER — HOSPITAL ENCOUNTER (EMERGENCY)
Age: 46
Discharge: HOME | End: 2021-02-19
Payer: MEDICARE

## 2021-02-19 VITALS
SYSTOLIC BLOOD PRESSURE: 159 MMHG | RESPIRATION RATE: 20 BRPM | HEIGHT: 65 IN | HEART RATE: 120 BPM | BODY MASS INDEX: 48.82 KG/M2 | OXYGEN SATURATION: 97 % | WEIGHT: 293 LBS | DIASTOLIC BLOOD PRESSURE: 118 MMHG | TEMPERATURE: 98.6 F

## 2021-02-19 DIAGNOSIS — K04.7 DENTAL INFECTION: Primary | ICD-10-CM

## 2021-02-19 DIAGNOSIS — R22.0 FACIAL SWELLING: ICD-10-CM

## 2021-02-19 DIAGNOSIS — K08.89 PAIN, DENTAL: ICD-10-CM

## 2021-02-19 PROCEDURE — 6370000000 HC RX 637 (ALT 250 FOR IP): Performed by: NURSE PRACTITIONER

## 2021-02-19 PROCEDURE — 99285 EMERGENCY DEPT VISIT HI MDM: CPT

## 2021-02-19 RX ORDER — TRAMADOL HYDROCHLORIDE 50 MG/1
50 TABLET ORAL ONCE
Status: COMPLETED | OUTPATIENT
Start: 2021-02-19 | End: 2021-02-19

## 2021-02-19 RX ORDER — TRAMADOL HYDROCHLORIDE 50 MG/1
50 TABLET ORAL EVERY 4 HOURS PRN
Qty: 12 TABLET | Refills: 0 | Status: SHIPPED | OUTPATIENT
Start: 2021-02-19 | End: 2021-02-22

## 2021-02-19 RX ORDER — PENICILLIN V POTASSIUM 500 MG/1
500 TABLET ORAL 4 TIMES DAILY
Qty: 28 TABLET | Refills: 0 | Status: SHIPPED | OUTPATIENT
Start: 2021-02-19 | End: 2021-02-26

## 2021-02-19 RX ORDER — IBUPROFEN 600 MG/1
600 TABLET ORAL EVERY 8 HOURS PRN
Qty: 21 TABLET | Refills: 0 | Status: ON HOLD | OUTPATIENT
Start: 2021-02-19 | End: 2021-03-06 | Stop reason: HOSPADM

## 2021-02-19 RX ORDER — PENICILLIN V POTASSIUM 250 MG/1
500 TABLET ORAL ONCE
Status: COMPLETED | OUTPATIENT
Start: 2021-02-19 | End: 2021-02-19

## 2021-02-19 RX ADMIN — TRAMADOL HYDROCHLORIDE 50 MG: 50 TABLET, FILM COATED ORAL at 22:54

## 2021-02-19 RX ADMIN — PENICILLIN V POTASSIUM 500 MG: 250 TABLET, FILM COATED ORAL at 22:54

## 2021-02-19 ASSESSMENT — PAIN DESCRIPTION - ORIENTATION
ORIENTATION: LEFT
ORIENTATION: LEFT

## 2021-02-19 ASSESSMENT — PAIN SCALES - GENERAL: PAINLEVEL_OUTOF10: 8

## 2021-02-19 ASSESSMENT — ENCOUNTER SYMPTOMS
SINUS PRESSURE: 0
CONSTIPATION: 0
COUGH: 0
CHEST TIGHTNESS: 0
SHORTNESS OF BREATH: 0
DIARRHEA: 0
ABDOMINAL PAIN: 0
RHINORRHEA: 0
COLOR CHANGE: 0
ABDOMINAL DISTENTION: 0
FACIAL SWELLING: 1
VOMITING: 0
SORE THROAT: 0
TROUBLE SWALLOWING: 0
NAUSEA: 0
WHEEZING: 0
SINUS PAIN: 0
BACK PAIN: 0

## 2021-02-19 ASSESSMENT — PAIN DESCRIPTION - FREQUENCY: FREQUENCY: CONTINUOUS

## 2021-02-19 ASSESSMENT — PAIN DESCRIPTION - LOCATION: LOCATION: FACE

## 2021-02-20 NOTE — ED PROVIDER NOTES
[address] [facility] ED  EMERGENCY DEPARTMENT ENCOUNTER      Pt Name: Urbano Colvin  MRN: [id number]  [place] 1975  Date of evaluation: 2/19/2021  Provider: MECHELLE Mclaughlin [id number]       Chief Complaint   Patient presents with    Facial Swelling         HISTORY OF PRESENT ILLNESS   (Location/Symptom, Timing/Onset,Context/Setting, Quality, Duration, Modifying Factors, Severity)  Note limiting factors. Urbano Colvin is a 39 y.o. female who presents to the emergency department for complaint of left-sided facial jaw swelling and lower dental pain. Patient states that yesterday she noticed some swelling on her gums and her left back molar was hurting her. She states that today the pain increased as well as the swelling currently 8 out of 10 aching throbbing continuous pain made worse with any pressure to the left side of her face and jaw or chewing. She states has been taking Motrin all day with very little relief of the pain and inflammation. She states that her tooth pain feels like the tooth was broken or chipped but she does not appear to be missing anything no obvious fracture to her tooth no direct injury to the jaw and neck. She denies any swelling of her lips tongue denies any difficulty breathing or swallowing. Nursing Notes were reviewed. REVIEW OF SYSTEMS    (2-9 systems for level 4, 10 or more for level 5)     Review of Systems   Constitutional: Negative for activity change, appetite change, chills, diaphoresis, fatigue and fever. HENT: Positive for dental problem and facial swelling. Negative for congestion, ear pain, postnasal drip, rhinorrhea, sinus pressure, sinus pain, sneezing, sore throat and trouble swallowing. Respiratory: Negative for cough, chest tightness, shortness of breath and wheezing. Cardiovascular: Negative for chest pain and palpitations.    Gastrointestinal: Negative for abdominal distention, abdominal pain, constipation, diarrhea, nausea and vomiting. Genitourinary: Negative for difficulty urinating, dysuria, flank pain, frequency and urgency. Musculoskeletal: Negative for arthralgias, back pain, myalgias, neck pain and neck stiffness. Skin: Negative for color change and rash. Neurological: Negative for dizziness, tremors, seizures, syncope, speech difficulty, weakness, light-headedness, numbness and headaches. Except as noted above the remainder of the review of systems was reviewed and negative. PAST MEDICAL HISTORY     Past Medical History:   Diagnosis Date    CHF (congestive heart failure) ([facility]     Diabetes mellitus ([facility]     Hypertension     Postpartum cardiomyopathy      History reviewed. No pertinent surgical history. Social History     Socioeconomic History    Marital status: Single     Spouse name: None    Number of children: None    Years of education: None    Highest education level: None   Occupational History    None   Social Needs    Financial resource strain: None    Food insecurity     Worry: None     Inability: None    Transportation needs     Medical: None     Non-medical: None   Tobacco Use    Smoking status: Current Every Day Smoker     Packs/day: 0.20    Smokeless tobacco: Never Used    Tobacco comment: smoked 0.5 PPD until 1 month ago for past 21 years. Currently smoking 2 cigarettes per day   Substance and Sexual Activity    Alcohol use:  Yes     Alcohol/week: 6.0 standard drinks     Types: 6 Cans of beer per week    Drug use: Not Currently     Types: Cocaine    Sexual activity: None   Lifestyle    Physical activity     Days per week: None     Minutes per session: None    Stress: None   Relationships    Social connections     Talks on phone: None     Gets together: None     Attends Yazidism service: None     Active member of club or organization: None     Attends meetings of clubs or organizations: None     Relationship status: None    Intimate partner violence Fear of current or ex partner: None     Emotionally abused: None     Physically abused: None     Forced sexual activity: None   Other Topics Concern    None   Social History Narrative    None       SCREENINGS             PHYSICAL EXAM    (up to 7 for level 4, 8 or more for level 5)     ED Triage Vitals   BP Temp Temp Source Pulse Resp SpO2 Height Weight   02/19/21 2218 02/19/21 2215 02/19/21 2215 02/19/21 2215 02/19/21 2215 02/19/21 2215 02/19/21 2215 02/19/21 2215   (!) 176/110 98.6 °F (37 °C) Temporal 79 16 98 % 5' 5\" (1.651 m) 300 lb (136.1 kg)       Physical Exam  Constitutional:       General: She is not in acute distress. Appearance: Normal appearance. She is obese. She is not ill-appearing, toxic-appearing or diaphoretic. HENT:      Head: Normocephalic and atraumatic. Right Ear: External ear normal.      Left Ear: External ear normal.      Nose: Nose normal. No congestion or rhinorrhea. Mouth/Throat:      Mouth: Mucous membranes are moist.      Dentition: Dental tenderness, gingival swelling, dental caries and dental abscesses present. Pharynx: Oropharynx is clear. No pharyngeal swelling, oropharyngeal exudate, posterior oropharyngeal erythema or uvula swelling. Eyes:      General:         Right eye: No discharge. Left eye: No discharge. Conjunctiva/sclera: Conjunctivae normal.      Pupils: Pupils are equal, round, and reactive to light. Neck:      Musculoskeletal: Normal range of motion and neck supple. No neck rigidity or muscular tenderness. Cardiovascular:      Rate and Rhythm: Regular rhythm. Tachycardia present. Pulses: Normal pulses. Pulmonary:      Effort: Pulmonary effort is normal.      Breath sounds: Normal breath sounds. Abdominal:      General: Bowel sounds are normal. There is no distension. Palpations: Abdomen is soft. Tenderness: There is no abdominal tenderness. Musculoskeletal: Normal range of motion.          General: No tenderness or signs of injury. Skin:     General: Skin is warm and dry. Capillary Refill: Capillary refill takes less than 2 seconds. Neurological:      General: No focal deficit present. Mental Status: She is alert and oriented to person, place, and time. Mental status is at baseline. Cranial Nerves: No cranial nerve deficit. Sensory: No sensory deficit. Motor: No weakness. Coordination: Coordination normal.         RESULTS     EKG: All EKG's are interpreted by the Emergency Department Physician who either signs or Co-signsthis chart in the absence of a cardiologist.        RADIOLOGY:   Vi Becka such as CT, Ultrasound and MRI are read by the radiologist. Plain radiographic images are visualized and preliminarily interpreted by the emergency physician with the below findings:        Interpretation per the Radiologist below, if available at the time ofthis note:    No orders to display         ED BEDSIDE ULTRASOUND:   Performed by ED Physician - none    LABS:  Labs Reviewed - No data to display    All other labs were within normal range or not returned as of this dictation. EMERGENCY DEPARTMENT COURSE and DIFFERENTIAL DIAGNOSIS/MDM:   Vitals:    Vitals:    02/19/21 2215 02/19/21 2218 02/19/21 2230   BP:  (!) 176/110 (!) 159/118   Pulse: 79 130 120   Resp: 16  20   Temp: 98.6 °F (37 °C)     TempSrc: Temporal     SpO2: 98%  97%   Weight: 300 lb (136.1 kg)     Height: 5' 5\" (1.651 m)              MDM patient presents afebrile nontoxic no acute distress noted to have elevated blood pressure and heart rate on arrival.  She does admit that she is not on any cardiac medications this time but states that she was only on them for short amount of time due to a CHF complication during her pregnancy. She has a follow-up with her cardiologist next week.   She states that at this time she is in pain and does appear to have notable discomfort and swelling of the left side of her face lower jaw. She was medicated for pain discomfort and noted that the blood pressure and heart rate were steadily decreasing as she was becoming more comfortable. She has no headache dizziness blurry vision chest pain or shortness of breath. She states that the heart rate does typically elevate when she is in hospital or doctor's appointments because she does have some anxiety about evaluations. Patient will be medicated first dose for suspected dental abscess and infection. She is directed to follow primary care provider cardiologist and dentist this was possible for evaluation. She states that she is feeling more comfortable after medication and does appear stable for discharge home directed to follow-up with the ER immediately for any onset of new concerns and worsening condition specifically chest pain shortness of breath high fevers difficulties breathing or swallowing. Patient verbalized understanding of all given instruction education. CRITICAL CARE TIME       CONSULTS:  None    PROCEDURES:  Unless otherwise noted below, none     Procedures    FINAL IMPRESSION      1. Dental infection    2. Facial swelling    3. Pain, dental          DISPOSITION/PLAN   DISPOSITION Decision To Discharge 02/19/2021 11:16:41 PM      PATIENT REFERRED TO:  [facility]  [facility] [address] [facility] [phone]  Call in 1 day  or your regular dentist    Yaneli Caputo MD  [address] [place] [address] 10  [phone]    Go in 1 week  as scheduled      DISCHARGE MEDICATIONS:  Discharge Medication List as of 2/19/2021 11:18 PM      START taking these medications    Details   penicillin v potassium (VEETID) 500 MG tablet Take 1 tablet by mouth 4 times daily for 7 days, Disp-28 tablet, R-0Print      traMADol (ULTRAM) 50 MG tablet Take 1 tablet by mouth every 4 hours as needed for Pain for up to 3 days. Intended supply: 3 days.  Take lowest dose possible to manage pain, Disp-12 tablet, R-0Print ibuprofen (ADVIL;MOTRIN) 600 MG tablet Take 1 tablet by mouth every 8 hours as needed for Pain, Disp-21 tablet, R-0Print                (Please notethat portions of this note were completed with a voice recognition program.  Efforts were made to edit the dictations but occasionally words are mis-transcribed.)    MECHELLE Balbuena CNP (electronically signed)  Attending Emergency Physician         MECHELLE Balbuena CNP  02/20/21 5898

## 2021-02-20 NOTE — ED TRIAGE NOTES
Pt c/o left sided facial pain and swelling x 4 days. States this evening swelling got much worse. States she thinks she has an infected tooth. States she has been taking Motrin with minimal relief. Pt alert and oriented x 4. Skin pink, warm, dry. Respirations even and unlabored. No distress noted at this time.

## 2021-02-24 ENCOUNTER — OFFICE VISIT (OUTPATIENT)
Dept: CARDIOLOGY CLINIC | Age: 46
End: 2021-02-24
Payer: MEDICARE

## 2021-02-24 VITALS
SYSTOLIC BLOOD PRESSURE: 138 MMHG | RESPIRATION RATE: 16 BRPM | DIASTOLIC BLOOD PRESSURE: 86 MMHG | WEIGHT: 200 LBS | OXYGEN SATURATION: 99 % | HEART RATE: 130 BPM | HEIGHT: 65 IN | BODY MASS INDEX: 33.32 KG/M2

## 2021-02-24 DIAGNOSIS — R06.09 DOE (DYSPNEA ON EXERTION): ICD-10-CM

## 2021-02-24 DIAGNOSIS — I10 ESSENTIAL HYPERTENSION: ICD-10-CM

## 2021-02-24 DIAGNOSIS — E78.5 DYSLIPIDEMIA: ICD-10-CM

## 2021-02-24 DIAGNOSIS — I50.33 ACUTE ON CHRONIC DIASTOLIC CHF (CONGESTIVE HEART FAILURE) (HCC): Primary | ICD-10-CM

## 2021-02-24 PROCEDURE — 99205 OFFICE O/P NEW HI 60 MIN: CPT | Performed by: INTERNAL MEDICINE

## 2021-02-24 PROCEDURE — 93000 ELECTROCARDIOGRAM COMPLETE: CPT | Performed by: INTERNAL MEDICINE

## 2021-02-24 RX ORDER — ASPIRIN 81 MG/1
81 TABLET ORAL DAILY
Qty: 90 TABLET | Refills: 1 | Status: SHIPPED | OUTPATIENT
Start: 2021-02-24

## 2021-02-24 RX ORDER — TRAMADOL HYDROCHLORIDE 50 MG/1
50 TABLET ORAL EVERY 6 HOURS PRN
COMMUNITY

## 2021-02-24 RX ORDER — LISINOPRIL 2.5 MG/1
2.5 TABLET ORAL DAILY
Qty: 30 TABLET | Refills: 3 | Status: SHIPPED | OUTPATIENT
Start: 2021-02-24

## 2021-02-24 RX ORDER — CARVEDILOL 12.5 MG/1
12.5 TABLET ORAL 2 TIMES DAILY
Qty: 60 TABLET | Refills: 3 | Status: SHIPPED | OUTPATIENT
Start: 2021-02-24

## 2021-02-24 ASSESSMENT — ENCOUNTER SYMPTOMS
SHORTNESS OF BREATH: 1
BLOOD IN STOOL: 0
NAUSEA: 0
GASTROINTESTINAL NEGATIVE: 1
COUGH: 0
CHEST TIGHTNESS: 0
EYES NEGATIVE: 1
STRIDOR: 0
WHEEZING: 0

## 2021-02-24 NOTE — PROGRESS NOTES
Yarsanism service: None     Active member of club or organization: None     Attends meetings of clubs or organizations: None     Relationship status: None    Intimate partner violence     Fear of current or ex partner: None     Emotionally abused: None     Physically abused: None     Forced sexual activity: None   Other Topics Concern    None   Social History Narrative    None       No Known Allergies    Current Outpatient Medications   Medication Sig Dispense Refill    traMADol (ULTRAM) 50 MG tablet Take 50 mg by mouth every 6 hours as needed for Pain.  aspirin EC 81 MG EC tablet Take 1 tablet by mouth daily 90 tablet 1    carvedilol (COREG) 12.5 MG tablet Take 1 tablet by mouth 2 times daily 60 tablet 3    lisinopril (PRINIVIL;ZESTRIL) 2.5 MG tablet Take 1 tablet by mouth daily 30 tablet 3    penicillin v potassium (VEETID) 500 MG tablet Take 1 tablet by mouth 4 times daily for 7 days 28 tablet 0    ibuprofen (ADVIL;MOTRIN) 600 MG tablet Take 1 tablet by mouth every 8 hours as needed for Pain 21 tablet 0    Blood Glucose Monitoring Suppl (RELION CONFIRM GLUCOSE MONITOR) w/Device KIT Check glucose 4 times daily 1 kit 0    RELION LANCETS MICRO-THIN 33G MISC 1 Device by Does not apply route 4 times daily (before meals and nightly) 150 each 0    glucose blood VI test strips (RELION CONFIRM/MICRO TEST) strip 1 each by In Vitro route 4 times daily (before meals and nightly) As needed. 150 strip 0    insulin regular (HUMULIN R) 100 UNIT/ML injection Inject 18 Units into the skin See Admin Instructions 1 vial 3    INSULIN SYRINGE .5CC/29G 29G X 1/2\" 0.5 ML MISC 1 each by Does not apply route 4 times daily (before meals and nightly) 100 each 3     No current facility-administered medications for this visit. Review of Systems:   Review of Systems   Constitutional: Negative. Negative for diaphoresis and fatigue. HENT: Negative. Eyes: Negative. Respiratory: Positive for shortness of breath. Negative for cough, chest tightness, wheezing and stridor. Cardiovascular: Positive for leg swelling. Negative for chest pain and palpitations. Gastrointestinal: Negative. Negative for blood in stool and nausea. Genitourinary: Negative. Musculoskeletal: Negative. Skin: Negative. Neurological: Negative. Negative for dizziness, syncope, weakness and light-headedness. Hematological: Negative. Psychiatric/Behavioral: Negative. Physical Examination:    /86 (Site: Left Upper Arm, Position: Sitting, Cuff Size: Large Adult)   Pulse 130   Resp 16   Ht 5' 5\" (1.651 m)   Wt 200 lb (90.7 kg)   SpO2 99%   BMI 33.28 kg/m²    Physical Exam   Constitutional: She appears healthy. No distress. HENT:   Normal cephalic and Atraumatic   Eyes: Pupils are equal, round, and reactive to light. Neck: Normal range of motion and thyroid normal. Neck supple. No JVD present. No neck adenopathy. No thyromegaly present. Cardiovascular: Normal rate, regular rhythm, normal heart sounds, intact distal pulses and normal pulses. Pulmonary/Chest: Effort normal and breath sounds normal. She has no wheezes. She has no rales. She exhibits no tenderness. Abdominal: Soft. Bowel sounds are normal. There is no abdominal tenderness. Musculoskeletal: Normal range of motion. General: Edema (trace) present. No tenderness. Neurological: She is alert and oriented to person, place, and time. Skin: Skin is warm. No cyanosis. Nails show no clubbing.        LABS:  CBC:   Lab Results   Component Value Date    WBC 6.2 06/01/2018    RBC 3.57 06/01/2018    HGB 11.5 06/01/2018    HCT 34.4 06/01/2018    MCV 96.3 06/01/2018    MCH 32.1 06/01/2018    MCHC 33.4 06/01/2018    RDW 13.7 06/01/2018     06/01/2018     Lipids:  Lab Results   Component Value Date    CHOL 181 01/30/2018     Lab Results   Component Value Date    TRIG 92 01/30/2018     Lab Results   Component Value Date    HDL 32 (L) 01/30/2018 Lab Results   Component Value Date    LDLCALC 131 (H) 01/30/2018     No results found for: LABVLDL, VLDL  No results found for: CHOLHDLRATIO  CMP:    Lab Results   Component Value Date     06/01/2018    K 4.0 06/01/2018     06/01/2018    CO2 20 06/01/2018    BUN 5 06/01/2018    CREATININE 0.49 06/01/2018    GFRAA >60.0 06/01/2018    LABGLOM >60.0 06/01/2018    GLUCOSE 148 06/14/2018    PROT 6.2 05/27/2018    LABALBU 3.8 05/27/2018    CALCIUM 8.2 06/01/2018    BILITOT 0.4 05/27/2018    ALKPHOS 180 05/27/2018    AST 14 05/27/2018    ALT 34 05/27/2018     BMP:    Lab Results   Component Value Date     06/01/2018    K 4.0 06/01/2018     06/01/2018    CO2 20 06/01/2018    BUN 5 06/01/2018    LABALBU 3.8 05/27/2018    CREATININE 0.49 06/01/2018    CALCIUM 8.2 06/01/2018    GFRAA >60.0 06/01/2018    LABGLOM >60.0 06/01/2018    GLUCOSE 148 06/14/2018     Magnesium:    Lab Results   Component Value Date    MG 1.9 06/01/2018     TSH:  Lab Results   Component Value Date    TSH 1.950 01/29/2018       Patient Active Problem List   Diagnosis    Acute heart failure (HCC)    Trichomonas vaginalis (TV) infection    Essential hypertension    Morbid obesity with BMI of 50.0-59.9, adult ([facility]    Smoker    Acute on chronic diastolic CHF (congestive heart failure) (HCC)    DKA, type 2, not at goal [facility])    Diabetic ketoacidosis without coma associated with type 2 diabetes mellitus ([facility]       Medications Discontinued During This Encounter   Medication Reason    aspirin 81 MG tablet LIST CLEANUP    atorvastatin (LIPITOR) 20 MG tablet LIST CLEANUP    carvedilol (COREG) 25 MG tablet LIST CLEANUP    insulin NPH (HUMULIN N) 100 UNIT/ML injection vial LIST CLEANUP    lisinopril (PRINIVIL;ZESTRIL) 2.5 MG tablet LIST CLEANUP    metFORMIN (GLUCOPHAGE) 1000 MG tablet LIST CLEANUP    Multiple Vitamins-Minerals (THERAPEUTIC MULTIVITAMIN-MINERALS) tablet LIST CLEANUP       Modified Medications    No medications on file       Orders Placed This Encounter   Medications    aspirin EC 81 MG EC tablet     Sig: Take 1 tablet by mouth daily     Dispense:  90 tablet     Refill:  1    carvedilol (COREG) 12.5 MG tablet     Sig: Take 1 tablet by mouth 2 times daily     Dispense:  60 tablet     Refill:  3    lisinopril (PRINIVIL;ZESTRIL) 2.5 MG tablet     Sig: Take 1 tablet by mouth daily     Dispense:  30 tablet     Refill:  3       Assessment/Plan:    1. Acute on chronic diastolic CHF (congestive heart failure) ([facility]     - Comprehensive Metabolic Panel; Future  - CBC; Future  - Magnesium; Future  - TSH without Reflex; Future  - NM MYOCARDIAL SPECT REST EXERCISE OR RX; Future    2. Smoker   stop    3. Acute diastolic heart failure ([facility]       4. Essential hypertension  Stay o meds. Low salt    5. CRISTINO (acute kidney injury) ([facility]  Check labs asap    6. DUEÑAS        Counseling:  Heart Healthy Lifestyle, Improve BMI, Stop Smoking, Low Salt Diet and Walk Daily    Return for AFTER TESTS.       Electronically signed by Yasmany Ellis MD on 2/24/2021 at 2:20 PM

## 2021-03-04 ENCOUNTER — HOSPITAL ENCOUNTER (INPATIENT)
Age: 46
LOS: 2 days | Discharge: HOME | DRG: 638 | End: 2021-03-06
Attending: INTERNAL MEDICINE | Admitting: INTERNAL MEDICINE
Payer: COMMERCIAL

## 2021-03-04 ENCOUNTER — APPOINTMENT (OUTPATIENT)
Dept: GENERAL RADIOLOGY | Age: 46
DRG: 638 | End: 2021-03-04
Payer: COMMERCIAL

## 2021-03-04 ENCOUNTER — APPOINTMENT (OUTPATIENT)
Dept: CT IMAGING | Age: 46
DRG: 638 | End: 2021-03-04
Payer: COMMERCIAL

## 2021-03-04 DIAGNOSIS — E11.10 DIABETIC KETOACIDOSIS WITHOUT COMA ASSOCIATED WITH TYPE 2 DIABETES MELLITUS (HCC): Primary | ICD-10-CM

## 2021-03-04 DIAGNOSIS — K59.00 CONSTIPATION, UNSPECIFIED CONSTIPATION TYPE: ICD-10-CM

## 2021-03-04 PROBLEM — E13.10 SECONDARY DM WITH DKA (HCC): Status: ACTIVE | Noted: 2021-03-04

## 2021-03-04 LAB
ALBUMIN SERPL-MCNC: 3.8 G/DL (ref 3.5–4.6)
ALP BLD-CCNC: 140 U/L (ref 40–130)
ALT SERPL-CCNC: 6 U/L (ref 0–33)
ANION GAP SERPL CALCULATED.3IONS-SCNC: 23 MEQ/L (ref 9–15)
ANION GAP SERPL CALCULATED.3IONS-SCNC: 26 MEQ/L (ref 9–15)
ANISOCYTOSIS: ABNORMAL
AST SERPL-CCNC: 7 U/L (ref 0–35)
BASE EXCESS VENOUS: -19 (ref -3–3)
BASOPHILS ABSOLUTE: 0 K/UL (ref 0–0.2)
BASOPHILS RELATIVE PERCENT: 0.8 %
BETA-HYDROXYBUTYRATE: 86.4 MG/DL (ref 0.2–2.8)
BILIRUB SERPL-MCNC: <0.2 MG/DL (ref 0.2–0.7)
BUN BLDV-MCNC: 7 MG/DL (ref 6–20)
BUN BLDV-MCNC: 8 MG/DL (ref 6–20)
CALCIUM IONIZED: 1.08 MMOL/L (ref 1.12–1.32)
CALCIUM SERPL-MCNC: 8.4 MG/DL (ref 8.5–9.9)
CALCIUM SERPL-MCNC: 8.9 MG/DL (ref 8.5–9.9)
CHLORIDE BLD-SCNC: 102 MEQ/L (ref 95–107)
CHLORIDE BLD-SCNC: 93 MEQ/L (ref 95–107)
CO2: 6 MEQ/L (ref 20–31)
CO2: 9 MEQ/L (ref 20–31)
CREAT SERPL-MCNC: 0.74 MG/DL (ref 0.5–0.9)
CREAT SERPL-MCNC: 0.82 MG/DL (ref 0.5–0.9)
D DIMER: 0.59 MG/L FEU (ref 0–0.5)
EKG ATRIAL RATE: 81 BPM
EKG P AXIS: 54 DEGREES
EKG P-R INTERVAL: 140 MS
EKG Q-T INTERVAL: 374 MS
EKG QRS DURATION: 88 MS
EKG QTC CALCULATION (BAZETT): 434 MS
EKG R AXIS: 28 DEGREES
EKG T AXIS: 32 DEGREES
EKG VENTRICULAR RATE: 81 BPM
EOSINOPHILS ABSOLUTE: 0 K/UL (ref 0–0.7)
EOSINOPHILS RELATIVE PERCENT: 0.5 %
GFR AFRICAN AMERICAN: >60
GFR NON-AFRICAN AMERICAN: >60
GLOBULIN: 3.4 G/DL (ref 2.3–3.5)
GLUCOSE BLD-MCNC: 111 MG/DL (ref 60–115)
GLUCOSE BLD-MCNC: 237 MG/DL (ref 70–99)
GLUCOSE BLD-MCNC: 247 MG/DL (ref 60–115)
GLUCOSE BLD-MCNC: 272 MG/DL (ref 60–115)
GLUCOSE BLD-MCNC: 300 MG/DL (ref 60–115)
GLUCOSE BLD-MCNC: 309 MG/DL (ref 60–115)
GLUCOSE BLD-MCNC: 357 MG/DL (ref 70–99)
GLUCOSE BLD-MCNC: 363 MG/DL (ref 60–115)
GLUCOSE BLD-MCNC: 371 MG/DL (ref 60–115)
HBA1C MFR BLD: 13.2 % (ref 4.8–5.9)
HCO3 VENOUS: 9.6 MMOL/L (ref 23–29)
HCT VFR BLD CALC: 44 % (ref 37–47)
HEMOGLOBIN: 13.9 G/DL (ref 12–16)
HEMOGLOBIN: 15.6 GM/DL (ref 12–16)
LACTATE: 2.07 MMOL/L (ref 0.4–2)
LYMPHOCYTES ABSOLUTE: 2.1 K/UL (ref 1–4.8)
LYMPHOCYTES RELATIVE PERCENT: 19 %
MAGNESIUM: 1.9 MG/DL (ref 1.7–2.4)
MAGNESIUM: 1.9 MG/DL (ref 1.7–2.4)
MCH RBC QN AUTO: 28 PG (ref 27–31.3)
MCHC RBC AUTO-ENTMCNC: 31.6 % (ref 33–37)
MCV RBC AUTO: 88.4 FL (ref 82–100)
MONOCYTES ABSOLUTE: 0.2 K/UL (ref 0.2–0.8)
MONOCYTES RELATIVE PERCENT: 1.9 %
MYELOCYTE PERCENT: 1 %
NEUTROPHILS ABSOLUTE: 9 K/UL (ref 1.4–6.5)
NEUTROPHILS RELATIVE PERCENT: 79 %
O2 SAT, VEN: 79 %
PCO2, VEN: 24.9 MM HG (ref 40–50)
PDW BLD-RTO: 17.1 % (ref 11.5–14.5)
PERFORMED ON: ABNORMAL
PERFORMED ON: NORMAL
PH VENOUS: 7.19 (ref 7.35–7.45)
PHOSPHORUS: 2.1 MG/DL (ref 2.3–4.8)
PLATELET # BLD: 339 K/UL (ref 130–400)
PLATELET SLIDE REVIEW: ADEQUATE
PO2, VEN: 52 MM HG
POC CHLORIDE: 105 MEQ/L (ref 99–110)
POC CREATININE: 0.6 MG/DL (ref 0.6–1.1)
POC HEMATOCRIT: 46 % (ref 36–48)
POC POTASSIUM: 3.7 MEQ/L (ref 3.5–5.1)
POC SAMPLE TYPE: ABNORMAL
POC SODIUM: 128 MEQ/L (ref 136–145)
POTASSIUM SERPL-SCNC: 3.8 MEQ/L (ref 3.4–4.9)
POTASSIUM SERPL-SCNC: 3.9 MEQ/L (ref 3.4–4.9)
PRO-BNP: 41 PG/ML
RBC # BLD: 4.98 M/UL (ref 4.2–5.4)
SARS-COV-2, NAAT: NOT DETECTED
SODIUM BLD-SCNC: 128 MEQ/L (ref 135–144)
SODIUM BLD-SCNC: 131 MEQ/L (ref 135–144)
TCO2 CALC VENOUS: 10 MMOL/L
TOTAL CK: 28 U/L (ref 0–170)
TOTAL PROTEIN: 7.2 G/DL (ref 6.3–8)
TROPONIN: <0.01 NG/ML (ref 0–0.01)
WBC # BLD: 11.2 K/UL (ref 4.8–10.8)

## 2021-03-04 PROCEDURE — 2580000003 HC RX 258: Performed by: INTERNAL MEDICINE

## 2021-03-04 PROCEDURE — 2580000003 HC RX 258: Performed by: PHYSICIAN ASSISTANT

## 2021-03-04 PROCEDURE — 83735 ASSAY OF MAGNESIUM: CPT

## 2021-03-04 PROCEDURE — 84100 ASSAY OF PHOSPHORUS: CPT

## 2021-03-04 PROCEDURE — 36600 WITHDRAWAL OF ARTERIAL BLOOD: CPT

## 2021-03-04 PROCEDURE — 99285 EMERGENCY DEPT VISIT HI MDM: CPT

## 2021-03-04 PROCEDURE — 80053 COMPREHEN METABOLIC PANEL: CPT

## 2021-03-04 PROCEDURE — 83605 ASSAY OF LACTIC ACID: CPT

## 2021-03-04 PROCEDURE — 82803 BLOOD GASES ANY COMBINATION: CPT

## 2021-03-04 PROCEDURE — 6370000000 HC RX 637 (ALT 250 FOR IP): Performed by: PHYSICIAN ASSISTANT

## 2021-03-04 PROCEDURE — 83036 HEMOGLOBIN GLYCOSYLATED A1C: CPT

## 2021-03-04 PROCEDURE — 71045 X-RAY EXAM CHEST 1 VIEW: CPT

## 2021-03-04 PROCEDURE — 82565 ASSAY OF CREATININE: CPT

## 2021-03-04 PROCEDURE — 36415 COLL VENOUS BLD VENIPUNCTURE: CPT

## 2021-03-04 PROCEDURE — 85014 HEMATOCRIT: CPT

## 2021-03-04 PROCEDURE — 85379 FIBRIN DEGRADATION QUANT: CPT

## 2021-03-04 PROCEDURE — 6360000002 HC RX W HCPCS: Performed by: INTERNAL MEDICINE

## 2021-03-04 PROCEDURE — 82330 ASSAY OF CALCIUM: CPT

## 2021-03-04 PROCEDURE — 85025 COMPLETE CBC W/AUTO DIFF WBC: CPT

## 2021-03-04 PROCEDURE — 84132 ASSAY OF SERUM POTASSIUM: CPT

## 2021-03-04 PROCEDURE — 82948 REAGENT STRIP/BLOOD GLUCOSE: CPT

## 2021-03-04 PROCEDURE — 84484 ASSAY OF TROPONIN QUANT: CPT

## 2021-03-04 PROCEDURE — 93005 ELECTROCARDIOGRAM TRACING: CPT | Performed by: PHYSICIAN ASSISTANT

## 2021-03-04 PROCEDURE — 84295 ASSAY OF SERUM SODIUM: CPT

## 2021-03-04 PROCEDURE — 87635 SARS-COV-2 COVID-19 AMP PRB: CPT

## 2021-03-04 PROCEDURE — 82435 ASSAY OF BLOOD CHLORIDE: CPT

## 2021-03-04 PROCEDURE — 2000000000 HC ICU R&B

## 2021-03-04 PROCEDURE — 83880 ASSAY OF NATRIURETIC PEPTIDE: CPT

## 2021-03-04 PROCEDURE — 82010 KETONE BODYS QUAN: CPT

## 2021-03-04 PROCEDURE — 82550 ASSAY OF CK (CPK): CPT

## 2021-03-04 PROCEDURE — 6360000004 HC RX CONTRAST MEDICATION: Performed by: PHYSICIAN ASSISTANT

## 2021-03-04 PROCEDURE — 71275 CT ANGIOGRAPHY CHEST: CPT

## 2021-03-04 PROCEDURE — 6370000000 HC RX 637 (ALT 250 FOR IP): Performed by: INTERNAL MEDICINE

## 2021-03-04 RX ORDER — POTASSIUM CHLORIDE 7.45 MG/ML
10 INJECTION INTRAVENOUS PRN
Status: DISCONTINUED | OUTPATIENT
Start: 2021-03-04 | End: 2021-03-06 | Stop reason: HOSPADM

## 2021-03-04 RX ORDER — 0.9 % SODIUM CHLORIDE 0.9 %
1000 INTRAVENOUS SOLUTION INTRAVENOUS ONCE
Status: DISCONTINUED | OUTPATIENT
Start: 2021-03-04 | End: 2021-03-04

## 2021-03-04 RX ORDER — DEXTROSE AND SODIUM CHLORIDE 5; .45 G/100ML; G/100ML
INJECTION, SOLUTION INTRAVENOUS CONTINUOUS PRN
Status: DISCONTINUED | OUTPATIENT
Start: 2021-03-04 | End: 2021-03-06 | Stop reason: HOSPADM

## 2021-03-04 RX ORDER — DEXTROSE MONOHYDRATE 25 G/50ML
12.5 INJECTION, SOLUTION INTRAVENOUS PRN
Status: DISCONTINUED | OUTPATIENT
Start: 2021-03-04 | End: 2021-03-06 | Stop reason: HOSPADM

## 2021-03-04 RX ORDER — 0.9 % SODIUM CHLORIDE 0.9 %
500 INTRAVENOUS SOLUTION INTRAVENOUS ONCE
Status: COMPLETED | OUTPATIENT
Start: 2021-03-04 | End: 2021-03-04

## 2021-03-04 RX ORDER — MAGNESIUM SULFATE 1 G/100ML
1000 INJECTION INTRAVENOUS PRN
Status: DISCONTINUED | OUTPATIENT
Start: 2021-03-04 | End: 2021-03-06 | Stop reason: HOSPADM

## 2021-03-04 RX ORDER — DOCUSATE SODIUM 100 MG/1
100 CAPSULE, LIQUID FILLED ORAL 2 TIMES DAILY
COMMUNITY

## 2021-03-04 RX ORDER — SODIUM CHLORIDE 450 MG/100ML
INJECTION, SOLUTION INTRAVENOUS CONTINUOUS
Status: DISCONTINUED | OUTPATIENT
Start: 2021-03-04 | End: 2021-03-05

## 2021-03-04 RX ORDER — POLYETHYLENE GLYCOL 3350 17 G/17G
17 POWDER, FOR SOLUTION ORAL DAILY
Status: DISCONTINUED | OUTPATIENT
Start: 2021-03-04 | End: 2021-03-06 | Stop reason: HOSPADM

## 2021-03-04 RX ORDER — SODIUM CHLORIDE 9 MG/ML
INJECTION, SOLUTION INTRAVENOUS CONTINUOUS
Status: DISCONTINUED | OUTPATIENT
Start: 2021-03-04 | End: 2021-03-05

## 2021-03-04 RX ADMIN — DEXTROSE AND SODIUM CHLORIDE: 5; 450 INJECTION, SOLUTION INTRAVENOUS at 21:20

## 2021-03-04 RX ADMIN — SODIUM CHLORIDE 7.4 UNITS/HR: 9 INJECTION, SOLUTION INTRAVENOUS at 20:28

## 2021-03-04 RX ADMIN — ENOXAPARIN SODIUM 40 MG: 40 INJECTION SUBCUTANEOUS at 20:18

## 2021-03-04 RX ADMIN — SODIUM CHLORIDE 500 ML: 9 INJECTION, SOLUTION INTRAVENOUS at 15:38

## 2021-03-04 RX ADMIN — SODIUM CHLORIDE: 9 INJECTION, SOLUTION INTRAVENOUS at 17:18

## 2021-03-04 RX ADMIN — SODIUM CHLORIDE: 9 INJECTION, SOLUTION INTRAVENOUS at 19:40

## 2021-03-04 RX ADMIN — POLYETHYLENE GLYCOL 3350 17 G: 17 POWDER, FOR SOLUTION ORAL at 20:18

## 2021-03-04 RX ADMIN — IOPAMIDOL 100 ML: 612 INJECTION, SOLUTION INTRAVENOUS at 17:52

## 2021-03-04 RX ADMIN — SODIUM CHLORIDE 0.5 UNITS/HR: 9 INJECTION, SOLUTION INTRAVENOUS at 17:12

## 2021-03-04 ASSESSMENT — ENCOUNTER SYMPTOMS
TROUBLE SWALLOWING: 0
ALLERGIC/IMMUNOLOGIC NEGATIVE: 1
APNEA: 0
ABDOMINAL PAIN: 0
COLOR CHANGE: 0
SHORTNESS OF BREATH: 1
CONSTIPATION: 1
EYE PAIN: 0

## 2021-03-04 ASSESSMENT — PAIN DESCRIPTION - FREQUENCY: FREQUENCY: CONTINUOUS

## 2021-03-04 ASSESSMENT — PAIN DESCRIPTION - PAIN TYPE: TYPE: ACUTE PAIN

## 2021-03-04 NOTE — H&P
Hospital Medicine  History and Physical    Patient:  Claus Gipson  MRN: [id number]    CHIEF COMPLAINT:    Chief Complaint   Patient presents with    Shortness of Breath     increasing since being here for dental pain.  Constipation     x2 weeks. History Obtained From:  Patient, EMR  Primary Care Physician: No primary care provider on file. HISTORY OF PRESENT ILLNESS:   The patient is a 39 y.o. female with PMH of chronic diastolic HF, IDDM2, HTN, obesity who presented with the above CC. Patient has been experiencing shortness of breath and constipation for the last couple of weeks, initial w/u showed evidence of DKA, IVFs and Insulin infusion started and patient was admitted to ICU for further management. Past Medical History:      Diagnosis Date    [facility]F [facility]congestive [facility] failure) ([facility]     Diabetes mellitus ([facility] [facility]     Hypertension     Postpartum cardiomyopathy        Past Surgical History:  History reviewed. No pertinent surgical history. Medications Prior to Admission:    Prior to Admission medications    Medication Sig Start Date End Date Taking? Authorizing Provider   traMADol (ULTRAM) 50 MG tablet Take 50 mg by mouth every 6 hours as needed for Pain.     Historical Provider, MD   aspirin EC 81 MG EC tablet Take 1 tablet by mouth daily 2/24/21   Renae Santiago MD   carvedilol (COREG) 12.5 MG tablet Take 1 tablet by mouth 2 times daily 2/24/21   Renae Santiago MD   lisinopril (PRINIVIL;ZESTRIL) 2.5 MG tablet Take 1 tablet by mouth daily 2/24/21   Renae Santiago MD   ibuprofen (ADVIL;MOTRIN) 600 MG tablet Take 1 tablet by mouth every 8 hours as needed for Pain 2/19/21 2/26/21  MECHELLE Oliveira - CNP   Blood Glucose Monitoring Suppl (RELION CONFIRM GLUCOSE MONITOR) w/Device KIT Check glucose 4 times daily 6/1/18   Sal Hernandez MD   RELION LANCETS MICRO-THIN 33G MISC 1 Device by Does not apply route 4 times daily (before meals and nightly) 6/1/18   Sal Hernandez MD   glucose blood VI test strips (RELION CONFIRM/MICRO TEST) strip 1 each by In Vitro route 4 times daily (before meals and nightly) As needed. 6/1/18   Rupert Frazier MD   insulin regular (HUMULIN R) 100 UNIT/ML injection Inject 18 Units into the skin See Admin Instructions 6/1/18   Rupert Frazier MD   INSULIN SYRINGE .5CC/29G 29G X 1/2\" 0.5 ML MISC 1 each by Does not apply route 4 times daily (before meals and nightly) 6/1/18   Rupert Frazier MD       Allergies:  Patient has no known allergies. Social History:   TOBACCO:   reports that she has been smoking. She has been smoking about 0.20 packs per day. She has never used smokeless tobacco.  ETOH:   reports current alcohol use of about 6.0 standard drinks of alcohol per week. Family History:       Problem Relation Age of Onset    Diabetes Mother     Heart Attack Father        REVIEW OF SYSTEMS:  Ten systems reviewed and negative except for stated in HPI    Physical Exam:    Vitals: /80   Pulse 75   Temp 98.4 °F (36.9 °C) (Oral)   Resp 18   Ht 5' 5\" (1.651 m)   Wt 200 lb (90.7 kg)   LMP 02/18/2021   SpO2 100%   BMI 33.28 kg/m²   General appearance: alert, appears stated age and cooperative, moderate acute distress  Skin: Skin color, texture, turgor normal.   HEENT: Head: Normocephalic, no lesions, without obvious abnormality. Eye: Normal external eye, conjunctiva, lids cornea, LAURA.   Neck: supple, symmetrical, trachea midline  Lungs: clear to auscultation bilaterally  Heart: regular rate and rhythm and S1, S2 normal  Abdomen: soft, BS active  Extremities: no edema  Neurologic: Mental status: Alert, oriented, thought content appropriate     Recent Labs     03/04/21  1530 03/04/21  1643   WBC 11.2*  --    HGB 13.9 15.6     --      Recent Labs     03/04/21  1530 03/04/21  1643   *  --    K 3.9  --    CL 93*  --    CO2 9*  --    BUN 8  --    CREATININE 0.82 0.6   GLUCOSE 357*  --    AST 7  --    ALT 6  --    BILITOT <0.2  --    ALKPHOS 140*  -- Troponin T:   Recent Labs     03/04/21  1530   TROPONINI <0.010       ABGs:   Lab Results   Component Value Date    PHART 7.335 05/27/2018    PO2ART 58 05/27/2018    PYB9DMT 20 05/27/2018     INR: No results for input(s): INR in the last 72 hours. URINALYSIS:No results for input(s): NITRITE, COLORU, PHUR, LABCAST, WBCUA, RBCUA, MUCUS, TRICHOMONAS, YEAST, BACTERIA, CLARITYU, SPECGRAV, LEUKOCYTESUR, UROBILINOGEN, BILIRUBINUR, BLOODU, GLUCOSEU, AMORPHOUS in the last 72 hours. Invalid input(s): Shahab Carrillo  -----------------------------------------------------------------   Cta Chest W Wo Contrast    Result Date: 3/4/2021  CT PULMONARY ANGIOGRAM WITH INTRAVENOUS CONTRAST MEDIUM. REASON FOR EXAMINATION:  ELEVATED D-DIMER TECHNIQUE: Helical CTA was performed through the chest utilizing 100 mL of Isovue-300 intravenous contrast.  Images were obtained with bolus tracking in order to opacify the pulmonary arteries. Thick section coronal MIP 3D reconstructions were performed  on a separate workstation. COMPARISON:none FINDINGS:  Pulmonary arteries: No intraluminal filling defects. Thoracic aorta: Normal in course and caliber. Cardiac Size: Normal. Pericardial effusion: None. Right lung: No nodules, masses, pleural effusion, pneumothorax. Small area of groundglass opacity laterally, right upper lobe Left lung: No nodules, masses, consolidation, pleural effusion, pneumothorax. Lymph notes/mediastinum: No hilar, mediastinal, or axillary lymph node enlargement. 1.4 cm low-attenuation nodule, right lobe thyroid Upper abdomen:Limited imaging upper abdomen shows no gross anomaly. Musculoskeletal:No osteoblastic, and no osteolytic lesions. No CT evidence pulmonary embolism. Small area of groundglass opacity lateral right upper lobe. Finding nonspecific, and may reflect inflammatory or infectious etiology. Right thyroid nodule. Nonemergent thyroid sonography recommended for further assessment to rule out malignancy.  All CT scans at this facility use dose modulation, iterative reconstruction, and/or weight based dosing when appropriate to reduce radiation dose to as low as reasonably achievable. Xr Chest Portable    Result Date: 3/4/2021  EXAMINATION: CHEST PORTABLE VIEW  CLINICAL HISTORY: Short of breath COMPARISONS: May 27, 2018  FINDINGS: Single  views of the chest is submitted. The cardiac silhouette is of normal size configuration. Pulmonary vascular unremarkable. Right sided trachea. No focal infiltrates. No Pneumothoraces. NO ACUTE ACTIVE CARDIOPULMONARY PROCESS          Assessment and Plan     39 y.o. female with PMH of chronic diastolic HF, IDDM2, HTN, obesity who presented with:    DKA  - continue IV hydration and IV insulin infusion per protocol  - endocrinology consulted for further management.     Shortness of breath  - CTA of the chest,EKG,troponin,BNP were negative  - cardiology consulted    HTN  - continue home meds    Constipation  - bowel regimen          Brett Barrera MD  Admitting Hospitalist

## 2021-03-04 NOTE — ED PROVIDER NOTES
[address] [facility] ED  EMERGENCY DEPARTMENT ENCOUNTER      Pt Name: Arnav Thomson  MRN: [id number]  [place] 1975  Date of evaluation: 3/4/2021  Provider: Ferny Torres PA-C    CHIEF COMPLAINT       Chief Complaint   Patient presents with    Shortness of Breath     increasing since being here for dental pain.  Constipation     x2 weeks. HISTORY OF PRESENT ILLNESS   (Location/Symptom, Timing/Onset, Context/Setting, Quality, Duration, Modifying Factors, Severity)  Note limiting factors. Arnav Thomson is a 39 y.o. female who presents to the emergency department with complaints of shortness of breath that has been ongoing throughout the course of the day today. Patient states that she was seen in the emergency department approximately 2 weeks ago for a dental abscess. Patient was placed on penicillin, tramadol and ibuprofen for the dental infection. Patient has been taking these medicines however she states that penicillin has caused her to have constipation and she has not moved her bowels in the last 2 weeks. Patient states that she cannot get into a dentist until April but she has been compliant with the antibiotics. Patient states that they told her that if she began to have shortness of breath severe to return to the emergency department and patient has been short of breath all day today. Patient states that this is accompanied with some mild fatigue and decreased appetite. Patient denies any known fevers. Patient denies any chest pain. Patient does state that she had some dizziness associated with the shortness of breath but that has resolved. No recent lower extremity edema or swelling. Patient describes the shortness of breath as exertional dyspnea. HPI    Nursing Notes were reviewed. REVIEW OF SYSTEMS    (2-9 systems for level 4, 10 or more for level 5)     Review of Systems   Constitutional: Negative for diaphoresis and fever.    HENT: Negative for hearing loss and daily (before meals and nightly)    TRAMADOL (ULTRAM) 50 MG TABLET    Take 50 mg by mouth every 6 hours as needed for Pain. ALLERGIES     Patient has no known allergies. FAMILY HISTORY       Family History   Problem Relation Age of Onset    Diabetes Mother     Heart Attack Father           SOCIAL HISTORY       Social History     Socioeconomic History    Marital status: Single     Spouse name: None    Number of children: None    Years of education: None    Highest education level: None   Occupational History    None   Social Needs    Financial resource strain: None    Food insecurity     Worry: None     Inability: None    Transportation needs     Medical: None     Non-medical: None   Tobacco Use    Smoking status: Current Every Day Smoker     Packs/day: 0.20    Smokeless tobacco: Never Used    Tobacco comment: smoked 0.5 PPD until 1 month ago for past 21 years. Currently smoking 2 cigarettes per day   Substance and Sexual Activity    Alcohol use:  Yes     Alcohol/week: 6.0 standard drinks     Types: 6 Cans of beer per week    Drug use: Not Currently     Types: Cocaine    Sexual activity: None   Lifestyle    Physical activity     Days per week: None     Minutes per session: None    Stress: None   Relationships    Social connections     Talks on phone: None     Gets together: None     Attends Mosque service: None     Active member of club or organization: None     Attends meetings of clubs or organizations: None     Relationship status: None    Intimate partner violence     Fear of current or ex partner: None     Emotionally abused: None     Physically abused: None     Forced sexual activity: None   Other Topics Concern    None   Social History Narrative    None       SCREENINGS        Danae Coma Scale  Eye Opening: Spontaneous  Best Verbal Response: Oriented  Best Motor Response: Obeys commands  [place] Coma Scale Score: 15               PHYSICAL EXAM    (up to 7 for level 4, 8 or more for level 5)     ED Triage Vitals [03/04/21 1504]   BP Temp Temp Source Pulse Resp SpO2 Height Weight   (!) 182/69 98.4 °F (36.9 °C) Oral 100 20 99 % 5' 5\" (1.651 m) 200 lb (90.7 kg)       Physical Exam  Vitals signs and nursing note reviewed. Constitutional:       General: She is not in acute distress. Appearance: She is well-developed. She is not diaphoretic. HENT:      Head: Normocephalic and atraumatic. Mouth/Throat:      Dentition: Dental abscesses present. Pharynx: No oropharyngeal exudate. Eyes:      General: No scleral icterus. Conjunctiva/sclera: Conjunctivae normal.      Pupils: Pupils are equal, round, and reactive to light. Neck:      Musculoskeletal: Normal range of motion and neck supple. Trachea: No tracheal deviation. Cardiovascular:      Rate and Rhythm: Normal rate. Heart sounds: Normal heart sounds. Pulmonary:      Effort: Pulmonary effort is normal. No respiratory distress. Breath sounds: Normal breath sounds. Abdominal:      General: Bowel sounds are normal. There is no distension. Palpations: Abdomen is soft. Musculoskeletal: Normal range of motion. Right lower leg: No edema. Left lower leg: No edema. Skin:     General: Skin is warm and dry. Findings: No erythema or rash. Neurological:      Mental Status: She is alert and oriented to person, place, and time. Cranial Nerves: No cranial nerve deficit. Motor: No abnormal muscle tone. Psychiatric:         Behavior: Behavior normal.         Thought Content: Thought content normal.         Judgment: Judgment normal.         DIAGNOSTIC RESULTS     EKG: All EKG's are interpreted by the Emergency Department Physician who either signs or Co-signs this chart in the absence of a cardiologist.    Normal sinus rhythm, rate 81 bpm, no acute ST elevation. No ectopy.   Normal axis    RADIOLOGY:   Non-plain film images such as CT, Ultrasound and MRI are read by the niurka Holgiun radiographic images are visualized and preliminarily interpreted by the emergency physician with the below findings:    NEG CXR    Interpretation per the Radiologist below, if available at the time of this note:    XR CHEST PORTABLE   Final Result   NO ACUTE ACTIVE [place]    (Results Pending)         ED BEDSIDE ULTRASOUND:   Performed by ED Physician - none    LABS:  Labs Reviewed   COMPREHENSIVE METABOLIC PANEL - Abnormal; Notable for the following components:       Result Value    Sodium 128 (*)     Chloride 93 (*)     CO2 9 (*)     Anion Gap 26 (*)     Glucose 357 (*)     Alkaline Phosphatase 140 (*)     All other components within normal limits    Narrative:     CALL  [place]  [facility] tel. [phone],  CO2 results called to and read back by Tanesha Chavarria, 03/04/2021 16:30, by  Satinder Laura   CBC WITH AUTO DIFFERENTIAL - Abnormal; Notable for the following components:    WBC 11.2 (*)     MCHC 31.6 (*)     RDW 17.1 (*)     Neutrophils Absolute 9.0 (*)     All other components within normal limits   D-DIMER, QUANTITATIVE - Abnormal; Notable for the following components:    D-Dimer, Quant 0.59 (*)     All other components within normal limits    Narrative:     CALL  [facility]  [facility] tel. [phone],  DIMER results called to and read back by Lalita Paz, 03/04/2021 16:44,  by Ritu Bhagat   BETA-HYDROXYBUTYRATE - Abnormal; Notable for the following components:    Beta-Hydroxybutyrate 86.4 (*)     All other components within normal limits   HEMOGLOBIN A1C - Abnormal; Notable for the following components:    Hemoglobin A1C 13.2 (*)     All other components within normal limits   POCT VENOUS - Abnormal; Notable for the following components:    POC Sodium 128 (*)     POC Glucose 363 (*)     Calcium, Ion 1.08 (*)     pH, Peng 7.194 (*)     pCO2, Peng 24.9 (*)     HCO3, Venous 9.6 (*)     Base Excess, Peng -19 (*)     Lactate 2.07 (*)     All other components within normal limits POCT GLUCOSE - Abnormal; Notable for the following components:    POC Glucose 371 (*)     All other components within normal limits   COVID-19, RAPID   MAGNESIUM    Narrative:     Monique Segura  [facility] tel. [phone],  CO2 results called to and read back by Tanesha Chavarria, 03/04/2021 16:30, by  Satinder Laura   TROPONIN   CK    Narrative:     Eugene Elm tel. [phone],  CO2 results called to and read back by Tanesha Chavarria, 03/04/2021 16:30, by  Gonzales Castillo   POCT EPOC BLOOD GAS, LACTIC ACID, ICA       All other labs were within normal range or not returned as of this dictation. EMERGENCY DEPARTMENT COURSE and DIFFERENTIAL DIAGNOSIS/MDM:   Vitals:    Vitals:    03/04/21 1615 03/04/21 1630 03/04/21 1638 03/04/21 1734   BP:  133/75 133/75 132/75   Pulse:   83 80   Resp:   18 18   Temp:       TempSrc:       SpO2: 100% 100% 100% 100%   Weight:       Height:               MDM      REASSESSMENT         Patient presented the emergency department with complaints of shortness of breath that has been ongoing all day today. In addition, the patient does state that she was having some dizziness and lightheadedness. Patient admits that she does not check her blood sugars but does use her insulin daily and has been watching her carb intake. Laboratory testing reveals metabolic acidosis on comprehensive metabolic panel with a blood sugar of 357. Venous blood gas was obtained which shows a pH of 7.19. I discussed with patient being in DKA today. Patient was given IV fluid/hydration and started on insulin drip and will be admitted in the hospital for further evaluation and care      CRITICAL CARE TIME   Total Critical Care time was 59 minutes, excluding separately reportable procedures. There was a high probability of clinically significant/life threatening deterioration in the patient's condition which required my urgent intervention.       CONSULTS:  None    PROCEDURES:  Unless otherwise noted below, none     Procedures        FINAL IMPRESSION      1. Diabetic ketoacidosis without coma associated with type 2 diabetes mellitus ([facility] [address]    2. Constipation, unspecified constipation type          DISPOSITION/PLAN   DISPOSITION Decision To Admit 03/04/2021 04:40:53 PM      PATIENT REFERRED TO:  No follow-up provider specified. DISCHARGE MEDICATIONS:  New Prescriptions    No medications on file     Controlled Substances Monitoring:     No flowsheet data found.     (Please note that portions of this note were completed with a voice recognition program.  Efforts were made to edit the dictations but occasionally words are mis-transcribed.)    Cody Wise PA-C (electronically signed)  Attending Emergency Physician              Cody Wise PA-C  03/04/21 3858

## 2021-03-04 NOTE — ED NOTES
PA was notified that pt's blood glucose was 272. The 6 unites of humulin R were cancelled per ROBYN Holland.       Kendy Vazquez RN  03/04/21 4427

## 2021-03-04 NOTE — ED TRIAGE NOTES
Patient arrived from home via family with complaint of sob since being d/c from here with dental pain. Patient A&OX4. Patient stated she was on PCN for the tooth abscess which she believes also made her constipated. Patient last bowel movement was 2 weeks ago.

## 2021-03-05 LAB
ANION GAP SERPL CALCULATED.3IONS-SCNC: 14 MEQ/L (ref 9–15)
ANION GAP SERPL CALCULATED.3IONS-SCNC: 14 MEQ/L (ref 9–15)
ANION GAP SERPL CALCULATED.3IONS-SCNC: 15 MEQ/L (ref 9–15)
ANION GAP SERPL CALCULATED.3IONS-SCNC: 15 MEQ/L (ref 9–15)
ANION GAP SERPL CALCULATED.3IONS-SCNC: 9 MEQ/L (ref 9–15)
BUN BLDV-MCNC: 4 MG/DL (ref 6–20)
BUN BLDV-MCNC: 5 MG/DL (ref 6–20)
BUN BLDV-MCNC: 5 MG/DL (ref 6–20)
BUN BLDV-MCNC: 6 MG/DL (ref 6–20)
BUN BLDV-MCNC: 6 MG/DL (ref 6–20)
CALCIUM SERPL-MCNC: 8.6 MG/DL (ref 8.5–9.9)
CALCIUM SERPL-MCNC: 8.7 MG/DL (ref 8.5–9.9)
CALCIUM SERPL-MCNC: 8.8 MG/DL (ref 8.5–9.9)
CALCIUM SERPL-MCNC: 8.9 MG/DL (ref 8.5–9.9)
CALCIUM SERPL-MCNC: 9 MG/DL (ref 8.5–9.9)
CHLORIDE BLD-SCNC: 102 MEQ/L (ref 95–107)
CHLORIDE BLD-SCNC: 106 MEQ/L (ref 95–107)
CHLORIDE BLD-SCNC: 108 MEQ/L (ref 95–107)
CHLORIDE BLD-SCNC: 109 MEQ/L (ref 95–107)
CHLORIDE BLD-SCNC: 110 MEQ/L (ref 95–107)
CO2: 13 MEQ/L (ref 20–31)
CO2: 15 MEQ/L (ref 20–31)
CO2: 15 MEQ/L (ref 20–31)
CO2: 16 MEQ/L (ref 20–31)
CO2: 17 MEQ/L (ref 20–31)
CREAT SERPL-MCNC: 0.44 MG/DL (ref 0.5–0.9)
CREAT SERPL-MCNC: 0.48 MG/DL (ref 0.5–0.9)
CREAT SERPL-MCNC: 0.48 MG/DL (ref 0.5–0.9)
CREAT SERPL-MCNC: 0.54 MG/DL (ref 0.5–0.9)
CREAT SERPL-MCNC: 0.73 MG/DL (ref 0.5–0.9)
GFR AFRICAN AMERICAN: >60
GFR NON-AFRICAN AMERICAN: >60
GLUCOSE BLD-MCNC: 101 MG/DL (ref 60–115)
GLUCOSE BLD-MCNC: 116 MG/DL (ref 70–99)
GLUCOSE BLD-MCNC: 139 MG/DL (ref 60–115)
GLUCOSE BLD-MCNC: 152 MG/DL (ref 60–115)
GLUCOSE BLD-MCNC: 152 MG/DL (ref 60–115)
GLUCOSE BLD-MCNC: 155 MG/DL (ref 60–115)
GLUCOSE BLD-MCNC: 159 MG/DL (ref 60–115)
GLUCOSE BLD-MCNC: 163 MG/DL (ref 70–99)
GLUCOSE BLD-MCNC: 167 MG/DL (ref 60–115)
GLUCOSE BLD-MCNC: 169 MG/DL (ref 70–99)
GLUCOSE BLD-MCNC: 173 MG/DL (ref 60–115)
GLUCOSE BLD-MCNC: 182 MG/DL (ref 60–115)
GLUCOSE BLD-MCNC: 184 MG/DL (ref 60–115)
GLUCOSE BLD-MCNC: 194 MG/DL (ref 60–115)
GLUCOSE BLD-MCNC: 199 MG/DL (ref 60–115)
GLUCOSE BLD-MCNC: 205 MG/DL (ref 60–115)
GLUCOSE BLD-MCNC: 215 MG/DL (ref 70–99)
GLUCOSE BLD-MCNC: 253 MG/DL (ref 60–115)
GLUCOSE BLD-MCNC: 390 MG/DL (ref 60–115)
GLUCOSE BLD-MCNC: 411 MG/DL (ref 70–99)
HBA1C MFR BLD: 13.7 % (ref 4.8–5.9)
HCT VFR BLD CALC: 38.2 % (ref 37–47)
HEMOGLOBIN: 12.4 G/DL (ref 12–16)
MAGNESIUM: 1.6 MG/DL (ref 1.7–2.4)
MAGNESIUM: 1.7 MG/DL (ref 1.7–2.4)
MCH RBC QN AUTO: 28.6 PG (ref 27–31.3)
MCHC RBC AUTO-ENTMCNC: 32.6 % (ref 33–37)
MCV RBC AUTO: 87.7 FL (ref 82–100)
PDW BLD-RTO: 16.6 % (ref 11.5–14.5)
PERFORMED ON: ABNORMAL
PERFORMED ON: NORMAL
PHOSPHORUS: 1.2 MG/DL (ref 2.3–4.8)
PHOSPHORUS: 1.4 MG/DL (ref 2.3–4.8)
PHOSPHORUS: 1.6 MG/DL (ref 2.3–4.8)
PHOSPHORUS: 1.8 MG/DL (ref 2.3–4.8)
PHOSPHORUS: 2.8 MG/DL (ref 2.3–4.8)
PLATELET # BLD: 259 K/UL (ref 130–400)
POTASSIUM SERPL-SCNC: 2.8 MEQ/L (ref 3.4–4.9)
POTASSIUM SERPL-SCNC: 3.2 MEQ/L (ref 3.4–4.9)
POTASSIUM SERPL-SCNC: 3.3 MEQ/L (ref 3.4–4.9)
POTASSIUM SERPL-SCNC: 3.4 MEQ/L (ref 3.4–4.9)
POTASSIUM SERPL-SCNC: 3.6 MEQ/L (ref 3.4–4.9)
RBC # BLD: 4.35 M/UL (ref 4.2–5.4)
SODIUM BLD-SCNC: 132 MEQ/L (ref 135–144)
SODIUM BLD-SCNC: 134 MEQ/L (ref 135–144)
SODIUM BLD-SCNC: 136 MEQ/L (ref 135–144)
SODIUM BLD-SCNC: 137 MEQ/L (ref 135–144)
SODIUM BLD-SCNC: 139 MEQ/L (ref 135–144)
TROPONIN: <0.01 NG/ML (ref 0–0.01)
TSH SERPL DL<=0.05 MIU/L-ACNC: 0.57 UIU/ML (ref 0.44–3.86)
WBC # BLD: 9.6 K/UL (ref 4.8–10.8)

## 2021-03-05 PROCEDURE — 36415 COLL VENOUS BLD VENIPUNCTURE: CPT

## 2021-03-05 PROCEDURE — 2500000003 HC RX 250 WO HCPCS: Performed by: INTERNAL MEDICINE

## 2021-03-05 PROCEDURE — 85027 COMPLETE CBC AUTOMATED: CPT

## 2021-03-05 PROCEDURE — 6370000000 HC RX 637 (ALT 250 FOR IP): Performed by: INTERNAL MEDICINE

## 2021-03-05 PROCEDURE — 84443 ASSAY THYROID STIM HORMONE: CPT

## 2021-03-05 PROCEDURE — 84681 ASSAY OF C-PEPTIDE: CPT

## 2021-03-05 PROCEDURE — 2000000000 HC ICU R&B

## 2021-03-05 PROCEDURE — 2580000003 HC RX 258: Performed by: INTERNAL MEDICINE

## 2021-03-05 PROCEDURE — 6360000002 HC RX W HCPCS: Performed by: INTERNAL MEDICINE

## 2021-03-05 PROCEDURE — 84484 ASSAY OF TROPONIN QUANT: CPT

## 2021-03-05 PROCEDURE — 83036 HEMOGLOBIN GLYCOSYLATED A1C: CPT

## 2021-03-05 PROCEDURE — 99222 1ST HOSP IP/OBS MODERATE 55: CPT | Performed by: INTERNAL MEDICINE

## 2021-03-05 PROCEDURE — 84100 ASSAY OF PHOSPHORUS: CPT

## 2021-03-05 PROCEDURE — 80048 BASIC METABOLIC PNL TOTAL CA: CPT

## 2021-03-05 PROCEDURE — 83735 ASSAY OF MAGNESIUM: CPT

## 2021-03-05 PROCEDURE — 93010 ELECTROCARDIOGRAM REPORT: CPT | Performed by: INTERNAL MEDICINE

## 2021-03-05 PROCEDURE — 86341 ISLET CELL ANTIBODY: CPT

## 2021-03-05 PROCEDURE — 99231 SBSQ HOSP IP/OBS SF/LOW 25: CPT | Performed by: INTERNAL MEDICINE

## 2021-03-05 RX ORDER — DEXTROSE MONOHYDRATE 25 G/50ML
12.5 INJECTION, SOLUTION INTRAVENOUS PRN
Status: DISCONTINUED | OUTPATIENT
Start: 2021-03-05 | End: 2021-03-06 | Stop reason: HOSPADM

## 2021-03-05 RX ORDER — SENNA PLUS 8.6 MG/1
1 TABLET ORAL NIGHTLY
Status: DISCONTINUED | OUTPATIENT
Start: 2021-03-05 | End: 2021-03-06 | Stop reason: HOSPADM

## 2021-03-05 RX ORDER — INSULIN GLARGINE 100 [IU]/ML
30 INJECTION, SOLUTION SUBCUTANEOUS NIGHTLY
Status: DISCONTINUED | OUTPATIENT
Start: 2021-03-05 | End: 2021-03-05

## 2021-03-05 RX ORDER — DEXTROSE MONOHYDRATE 50 MG/ML
100 INJECTION, SOLUTION INTRAVENOUS PRN
Status: DISCONTINUED | OUTPATIENT
Start: 2021-03-05 | End: 2021-03-06 | Stop reason: HOSPADM

## 2021-03-05 RX ORDER — NICOTINE POLACRILEX 4 MG
15 LOZENGE BUCCAL PRN
Status: DISCONTINUED | OUTPATIENT
Start: 2021-03-05 | End: 2021-03-06 | Stop reason: HOSPADM

## 2021-03-05 RX ORDER — LISINOPRIL 5 MG/1
2.5 TABLET ORAL DAILY
Status: DISCONTINUED | OUTPATIENT
Start: 2021-03-05 | End: 2021-03-06 | Stop reason: HOSPADM

## 2021-03-05 RX ORDER — INSULIN GLARGINE 100 [IU]/ML
50 INJECTION, SOLUTION SUBCUTANEOUS NIGHTLY
Status: DISCONTINUED | OUTPATIENT
Start: 2021-03-06 | End: 2021-03-06 | Stop reason: HOSPADM

## 2021-03-05 RX ORDER — ASPIRIN 81 MG/1
81 TABLET ORAL DAILY
Status: DISCONTINUED | OUTPATIENT
Start: 2021-03-05 | End: 2021-03-06 | Stop reason: HOSPADM

## 2021-03-05 RX ORDER — CARVEDILOL 12.5 MG/1
12.5 TABLET ORAL 2 TIMES DAILY
Status: DISCONTINUED | OUTPATIENT
Start: 2021-03-05 | End: 2021-03-06 | Stop reason: HOSPADM

## 2021-03-05 RX ORDER — POTASSIUM CHLORIDE 20 MEQ/1
80 TABLET, EXTENDED RELEASE ORAL ONCE
Status: COMPLETED | OUTPATIENT
Start: 2021-03-05 | End: 2021-03-05

## 2021-03-05 RX ADMIN — SODIUM CHLORIDE 2.4 UNITS/HR: 9 INJECTION, SOLUTION INTRAVENOUS at 11:14

## 2021-03-05 RX ADMIN — DEXTROSE AND SODIUM CHLORIDE: 5; 450 INJECTION, SOLUTION INTRAVENOUS at 10:32

## 2021-03-05 RX ADMIN — SODIUM CHLORIDE 2.4 UNITS/HR: 9 INJECTION, SOLUTION INTRAVENOUS at 10:16

## 2021-03-05 RX ADMIN — CARVEDILOL 12.5 MG: 12.5 TABLET, FILM COATED ORAL at 21:04

## 2021-03-05 RX ADMIN — POTASSIUM CHLORIDE 80 MEQ: 20 TABLET, EXTENDED RELEASE ORAL at 12:10

## 2021-03-05 RX ADMIN — POTASSIUM PHOSPHATE, MONOBASIC AND POTASSIUM PHOSPHATE, DIBASIC 30 MMOL: 224; 236 INJECTION, SOLUTION, CONCENTRATE INTRAVENOUS at 10:31

## 2021-03-05 RX ADMIN — POTASSIUM CHLORIDE 10 MEQ: 7.46 INJECTION, SOLUTION INTRAVENOUS at 10:32

## 2021-03-05 RX ADMIN — POLYETHYLENE GLYCOL 3350 17 G: 17 POWDER, FOR SOLUTION ORAL at 08:32

## 2021-03-05 RX ADMIN — ENOXAPARIN SODIUM 40 MG: 40 INJECTION SUBCUTANEOUS at 08:32

## 2021-03-05 RX ADMIN — INSULIN GLARGINE 30 UNITS: 100 INJECTION, SOLUTION SUBCUTANEOUS at 21:05

## 2021-03-05 RX ADMIN — LISINOPRIL 2.5 MG: 5 TABLET ORAL at 10:32

## 2021-03-05 RX ADMIN — DEXTROSE AND SODIUM CHLORIDE: 5; 450 INJECTION, SOLUTION INTRAVENOUS at 03:03

## 2021-03-05 RX ADMIN — ASPIRIN 81 MG: 81 TABLET, COATED ORAL at 10:32

## 2021-03-05 RX ADMIN — CARVEDILOL 12.5 MG: 12.5 TABLET, FILM COATED ORAL at 10:32

## 2021-03-05 ASSESSMENT — ENCOUNTER SYMPTOMS
BLOOD IN STOOL: 0
NAUSEA: 0
WHEEZING: 0
GASTROINTESTINAL NEGATIVE: 1
STRIDOR: 0
COUGH: 0
EYES NEGATIVE: 1
CHEST TIGHTNESS: 0
RESPIRATORY NEGATIVE: 1
SHORTNESS OF BREATH: 0

## 2021-03-05 ASSESSMENT — PAIN SCALES - GENERAL
PAINLEVEL_OUTOF10: 0
PAINLEVEL_OUTOF10: 3

## 2021-03-05 NOTE — ED NOTES
ICU called and they can't come down to get pt. Pt is placed on the lifepack.       Wayne Andrew, YOGI  03/04/21 1375

## 2021-03-05 NOTE — PROGRESS NOTES
Patient seen and examined during ICU morning multidisciplinary rounds lab studies and imaging studies reviewed, no active critical care issues, agree with managing team.    Patient looking good, no issues, still on insulin drip, gap is closed, she is improving slowly, no nausea or vomiting,  Blood pressure 139/82, pulse 78, temperature 97.8 °F (36.6 °C), temperature source Oral, resp. rate 15, height 5' 5\" (1.651 m), weight 198 lb 13.7 oz (90.2 kg), last menstrual period 02/18/2021, SpO2 100 %, not currently breastfeeding.   Sitting on the side of the bed, no distress, skin is nonicteric, no apparent rash  Phosphorus is low and replaced,  Continue plan per treatment team

## 2021-03-05 NOTE — CONSULTS
Consults    Patient Name: Bess Kunz Date: 3/4/2021  3:08 PM  MR #: [id number]  : 1975    Attending Physician: Raheem Pederson MD  Reason for consult: SOB    History of Presenting Illness:      Leilani Lieberman is a 39 y.o. female on hospital day 1 with a history of . History Obtained From:  patient, electronic medical record    Pt admitted with DKA. Treatment in progress. K 2.8. she has chronic DHC and SOB. She is not in volume overload. She is on RA and sats 100%   Denies CP nor dizizness. Has h/o CRISTINO but resolved. History:      EKG:SR 80  Past Medical History:   Diagnosis Date    CH[facility]congestive [facility] failure) [facility] [address]     Diabetes mellitus ([facility] [address]     Hypertension     Postpartum cardiomyopathy      History reviewed. No pertinent surgical history. Family History  Family History   Problem Relation Age of Onset    Diabetes Mother     Heart Attack Father      [] Unable to obtain due to ventilated and/ or neurologic status    Social History     Socioeconomic History    Marital status: Single     Spouse name: Not on file    Number of children: Not on file    Years of education: Not on file    Highest education level: Not on file   Occupational History    Not on file   Social Needs    Financial resource strain: Not on file    Food insecurity     Worry: Not on file     Inability: Not on file    Transportation needs     Medical: Not on file     Non-medical: Not on file   Tobacco Use    Smoking status: Current Every Day Smoker     Packs/day: 0.20    Smokeless tobacco: Never Used    Tobacco comment: smoked 0.5 PPD until 1 month ago for past 21 years. Currently smoking 2 cigarettes per day   Substance and Sexual Activity    Alcohol use:  Yes     Alcohol/week: 6.0 standard drinks     Types: 6 Cans of beer per week    Drug use: Not Currently     Types: Cocaine    Sexual activity: Not on file   Lifestyle    Physical activity     Days per week: Not on file     Minutes per session: Not on file    Stress: Not on file   Relationships    Social connections     Talks on phone: Not on file     Gets together: Not on file     Attends Voodoo service: Not on file     Active member of club or organization: Not on file     Attends meetings of clubs or organizations: Not on file     Relationship status: Not on file    Intimate partner violence     Fear of current or ex partner: Not on file     Emotionally abused: Not on file     Physically abused: Not on file     Forced sexual activity: Not on file   Other Topics Concern    Not on file   Social History Narrative    Not on file      [] Unable to obtain due to ventilated and/ or neurologic status      Home Medications:      Medications Prior to Admission: docusate sodium (COLACE) 100 MG capsule, Take 100 mg by mouth 2 times daily  traMADol (ULTRAM) 50 MG tablet, Take 50 mg by mouth every 6 hours as needed for Pain. aspirin EC 81 MG EC tablet, Take 1 tablet by mouth daily  carvedilol (COREG) 12.5 MG tablet, Take 1 tablet by mouth 2 times daily  lisinopril (PRINIVIL;ZESTRIL) 2.5 MG tablet, Take 1 tablet by mouth daily  insulin regular (HUMULIN R) 100 UNIT/ML injection, Inject 18 Units into the skin See Admin Instructions  ibuprofen (ADVIL;MOTRIN) 600 MG tablet, Take 1 tablet by mouth every 8 hours as needed for Pain  Blood Glucose Monitoring Suppl (RELION CONFIRM GLUCOSE MONITOR) w/Device KIT, Check glucose 4 times daily  RELION LANCETS MICRO-THIN 33G MISC, 1 Device by Does not apply route 4 times daily (before meals and nightly)  glucose blood VI test strips (RELION CONFIRM/MICRO TEST) strip, 1 each by In Vitro route 4 times daily (before meals and nightly) As needed.   INSULIN SYRINGE .5CC/29G 29G X 1/2\" 0.5 ML MISC, 1 each by Does not apply route 4 times daily (before meals and nightly)    Current Hospital Medications:     Scheduled Meds:   aspirin EC  81 mg Oral Daily    carvedilol  12.5 mg Oral BID    lisinopril  2.5 mg Oral Daily    senna  1 tablet Oral Nightly    potassium phosphate IVPB  30 mmol Intravenous Once    potassium chloride  80 mEq Oral Once    enoxaparin  40 mg Subcutaneous Daily    polyethylene glycol  17 g Oral Daily     Continuous Infusions:   dextrose      sodium chloride 250 mL/hr at 03/04/21 2050    sodium chloride      dextrose 5 % and 0.45 % NaCl 150 mL/hr at 03/05/21 1032    insulin 2.4 Units/hr (03/05/21 1114)     PRN Meds:.glucose, dextrose, glucagon (rDNA), dextrose, dextrose, potassium chloride, magnesium sulfate, sodium phosphate IVPB **OR** sodium phosphate IVPB **OR** sodium phosphate IVPB, dextrose 5 % and 0.45 % NaCl  .  dextrose      sodium chloride 250 mL/hr at 03/04/21 2050    sodium chloride      dextrose 5 % and 0.45 % NaCl 150 mL/hr at 03/05/21 1032    insulin 2.4 Units/hr (03/05/21 1114)        Allergies:     No Known Allergies     Review of Systems:       Review of Systems   Constitutional: Negative. Negative for diaphoresis and fatigue. HENT: Negative. Eyes: Negative. Respiratory: Negative. Negative for cough, chest tightness, shortness of breath, wheezing and stridor. Cardiovascular: Negative. Negative for chest pain, palpitations and leg swelling. Gastrointestinal: Negative. Negative for blood in stool and nausea. Genitourinary: Negative. Musculoskeletal: Negative. Skin: Negative. Neurological: Negative. Negative for dizziness, syncope, weakness and light-headedness. Hematological: Negative. Psychiatric/Behavioral: Negative. Objective Findings:     Vitals:/82   Pulse 78   Temp 97.8 °F (36.6 °C) (Oral)   Resp 15   Ht 5' 5\" (1.651 m)   Wt 198 lb 13.7 oz (90.2 kg)   LMP 02/18/2021   SpO2 100%   BMI 33.09 kg/m²      Physical Examination:    Physical Exam   Constitutional: She appears healthy. No distress. HENT:   Normal cephalic and Atraumatic   Eyes: Pupils are equal, round, and reactive to light.    Neck: Normal range of motion and thyroid normal. Neck supple. No JVD present. No neck adenopathy. No thyromegaly present. Cardiovascular: Normal rate, regular rhythm, normal heart sounds, intact distal pulses and normal pulses. Pulmonary/Chest: Effort normal and breath sounds normal. She has no wheezes. She has no rales. She exhibits no tenderness. Abdominal: Soft. Bowel sounds are normal. There is no abdominal tenderness. Musculoskeletal: Normal range of motion. General: No tenderness or edema. Neurological: She is alert and oriented to person, place, and time. Skin: Skin is warm. No cyanosis. Nails show no clubbing. Results/ Medications reviewed 3/5/2021, 11:42 AM     Laboratory, Microbiology, Pathology, Radiology, Cardiology, Medications and Transcriptions reviewed  Scheduled Meds:   aspirin EC  81 mg Oral Daily    carvedilol  12.5 mg Oral BID    lisinopril  2.5 mg Oral Daily    senna  1 tablet Oral Nightly    potassium phosphate IVPB  30 mmol Intravenous Once    potassium chloride  80 mEq Oral Once    enoxaparin  40 mg Subcutaneous Daily    polyethylene glycol  17 g Oral Daily     Continuous Infusions:   dextrose      sodium chloride 250 mL/hr at 03/04/21 2050    sodium chloride      dextrose 5 % and 0.45 % NaCl 150 mL/hr at 03/05/21 1032    insulin 2.4 Units/hr (03/05/21 1114)       Recent Labs     03/04/21  1530 03/04/21  1643 03/05/21  0505   WBC 11.2*  --  9.6   HGB 13.9 15.6 12.4   HCT 44.0  --  38.2   MCV 88.4  --  87.7     --  259     Recent Labs     03/04/21  2132 03/05/21  0505 03/05/21  0836   * 137 136   K 3.8 3.4 2.8*    110* 106   CO2 6* 13* 15*   PHOS 2.1* 1.2* 1.4*   BUN 7 6 6   CREATININE 0.74 0.54 0.48*     Recent Labs     03/04/21  1530   AST 7   ALT 6   BILITOT <0.2   ALKPHOS 140*     No results for input(s): LIPASE, AMYLASE in the last 72 hours.   Recent Labs     03/04/21  1530   PROT 7.2     Cta Chest W Wo Contrast    Result Date: 3/4/2021  CT PULMONARY ANGIOGRAM WITH INTRAVENOUS CONTRAST MEDIUM. REASON FOR EXAMINATION:  ELEVATED D-DIMER TECHNIQUE: Helical CTA was performed through the chest utilizing 100 mL of Isovue-300 intravenous contrast.  Images were obtained with bolus tracking in order to opacify the pulmonary arteries. Thick section coronal MIP 3D reconstructions were performed  on a separate workstation. COMPARISON:none FINDINGS:  Pulmonary arteries: No intraluminal filling defects. Thoracic aorta: Normal in course and caliber. Cardiac Size: Normal. Pericardial effusion: None. Right lung: No nodules, masses, pleural effusion, pneumothorax. Small area of groundglass opacity laterally, right upper lobe Left lung: No nodules, masses, consolidation, pleural effusion, pneumothorax. Lymph notes/mediastinum: No hilar, mediastinal, or axillary lymph node enlargement. 1.4 cm low-attenuation nodule, right lobe thyroid Upper abdomen:Limited imaging upper abdomen shows no gross anomaly. Musculoskeletal:No osteoblastic, and no osteolytic lesions. No CT evidence pulmonary embolism. Small area of groundglass opacity lateral right upper lobe. Finding nonspecific, and may reflect inflammatory or infectious etiology. Right thyroid nodule. Nonemergent thyroid sonography recommended for further assessment to rule out malignancy. All CT scans at this facility use dose modulation, iterative reconstruction, and/or weight based dosing when appropriate to reduce radiation dose to as low as reasonably achievable. Xr Chest Portable    Result Date: 3/4/2021  EXAMINATION: CHEST PORTABLE VIEW  CLINICAL HISTORY: Short of breath COMPARISONS: May 27, 2018  FINDINGS: Single  views of the chest is submitted. The cardiac silhouette is of normal size configuration. Pulmonary vascular unremarkable. Right sided trachea. No focal infiltrates. No Pneumothoraces.                                                                                    NO ACUTE ACTIVE CARDIOPULMONARY PROCESS      Active Hospital Problems    Diagnosis Date Noted    Secondary DM with DKA ([facility] [address] [E13.10] 03/04/2021     Priority: Low         Impression/Plan:   1. DKA - improving  2. Hypokalemia- replace  3. Advanced Diastolic Dysfunction chronic stable DHF  4. LVEF 50%  5. No acute Cardiac issues noted. Thank you for allowing us to participate in the care of this patient. Will continue to follow. Please call if questions or concerns arise.     Electronically signed by Angelo Sheffield MD on 3/5/2021 at 11:42 AM

## 2021-03-05 NOTE — CARE COORDINATION
[facility] Case Management Initial Discharge Assessment    Met with Patient to discuss discharge plan. PCP: No primary care provider on file. Date of Last Visit:     If no PCP, list provided? Yes    Discharge Planning    Living Arrangements: independently at home    Who do you live with? BOYFRIEND, MOM AND KIDS    Who helps you with your care:  self    If lives at home:     Do you have any barriers navigating in your home? no    Patient can perform ADL? Yes    Current Services (outpatient and in home) :  None    Dialysis: No    Is transportation available to get to your appointments? Yes    DME Equipment:  no    Respiratory equipment: None    Respiratory provider:  no     Pharmacy:  yes -  [address] with Medication Assistance Program?  No      Patient agreeable to [place] [address]? Declined    Patient agreeable to SNF/Rehab? Declined    Other discharge needs identified? N/A    Freedom of choice list provided with basic dialogue that supports the patient's individualized plan of care/goals and shares the quality data associated with the providers. N/A    The plan for Transition of Care is related to the following treatment goals: STABLE GLUCOSE    Initial Discharge Plan? (Note: please see concurrent daily documentation for any updates after initial note). HOME, DENIES NEEDS.  HAS PCP LIST AND WILL INFORM RN OF CHOICE     The Patient and/or patient representative: PATIENT was provided with choice of any post-acute providers for care and equipment and agrees with discharge plan  Yes  LOW RISK FOR READMIT    Electronically signed by Edis Tse RN on 3/5/2021 at 10:56 AM

## 2021-03-05 NOTE — FLOWSHEET NOTE
Pt to ICU from ED Assessment complete. Skin Assessment Radha RN/Marin RN skin intact meplex in place. AOx4, independent. No remarkable events occurred overnight.

## 2021-03-06 ENCOUNTER — APPOINTMENT (OUTPATIENT)
Dept: ULTRASOUND IMAGING | Age: 46
DRG: 638 | End: 2021-03-06
Payer: COMMERCIAL

## 2021-03-06 VITALS
OXYGEN SATURATION: 99 % | TEMPERATURE: 98.2 F | HEART RATE: 78 BPM | RESPIRATION RATE: 18 BRPM | WEIGHT: 198.85 LBS | BODY MASS INDEX: 33.13 KG/M2 | HEIGHT: 65 IN | SYSTOLIC BLOOD PRESSURE: 127 MMHG | DIASTOLIC BLOOD PRESSURE: 77 MMHG

## 2021-03-06 LAB
ANION GAP SERPL CALCULATED.3IONS-SCNC: 10 MEQ/L (ref 9–15)
BUN BLDV-MCNC: 5 MG/DL (ref 6–20)
CALCIUM SERPL-MCNC: 8.6 MG/DL (ref 8.5–9.9)
CHLORIDE BLD-SCNC: 103 MEQ/L (ref 95–107)
CO2: 19 MEQ/L (ref 20–31)
CREAT SERPL-MCNC: 0.43 MG/DL (ref 0.5–0.9)
GFR AFRICAN AMERICAN: >60
GFR NON-AFRICAN AMERICAN: >60
GLUCOSE BLD-MCNC: 159 MG/DL (ref 60–115)
GLUCOSE BLD-MCNC: 215 MG/DL (ref 70–99)
GLUCOSE BLD-MCNC: 311 MG/DL (ref 60–115)
MAGNESIUM: 1.7 MG/DL (ref 1.7–2.4)
PERFORMED ON: ABNORMAL
PERFORMED ON: ABNORMAL
PHOSPHORUS: 2.4 MG/DL (ref 2.3–4.8)
POTASSIUM SERPL-SCNC: 2.8 MEQ/L (ref 3.4–4.9)
SODIUM BLD-SCNC: 132 MEQ/L (ref 135–144)

## 2021-03-06 PROCEDURE — 84100 ASSAY OF PHOSPHORUS: CPT

## 2021-03-06 PROCEDURE — 36415 COLL VENOUS BLD VENIPUNCTURE: CPT

## 2021-03-06 PROCEDURE — 6370000000 HC RX 637 (ALT 250 FOR IP): Performed by: INTERNAL MEDICINE

## 2021-03-06 PROCEDURE — 76536 US EXAM OF HEAD AND NECK: CPT

## 2021-03-06 PROCEDURE — 83735 ASSAY OF MAGNESIUM: CPT

## 2021-03-06 PROCEDURE — 80048 BASIC METABOLIC PNL TOTAL CA: CPT

## 2021-03-06 PROCEDURE — 6360000002 HC RX W HCPCS: Performed by: INTERNAL MEDICINE

## 2021-03-06 RX ORDER — ACETAMINOPHEN 325 MG/1
650 TABLET ORAL EVERY 4 HOURS PRN
Status: DISCONTINUED | OUTPATIENT
Start: 2021-03-06 | End: 2021-03-06 | Stop reason: HOSPADM

## 2021-03-06 RX ORDER — POTASSIUM CHLORIDE 20 MEQ/1
60 TABLET, EXTENDED RELEASE ORAL ONCE
Status: COMPLETED | OUTPATIENT
Start: 2021-03-06 | End: 2021-03-06

## 2021-03-06 RX ORDER — POTASSIUM CHLORIDE 20 MEQ/1
40 TABLET, EXTENDED RELEASE ORAL ONCE
Status: COMPLETED | OUTPATIENT
Start: 2021-03-06 | End: 2021-03-06

## 2021-03-06 RX ORDER — INSULIN GLARGINE 100 [IU]/ML
50 INJECTION, SOLUTION SUBCUTANEOUS NIGHTLY
Qty: 1 VIAL | Refills: 3 | Status: SHIPPED | OUTPATIENT
Start: 2021-03-06 | End: 2021-05-12

## 2021-03-06 RX ADMIN — POTASSIUM CHLORIDE 10 MEQ: 7.46 INJECTION, SOLUTION INTRAVENOUS at 06:14

## 2021-03-06 RX ADMIN — POTASSIUM CHLORIDE 60 MEQ: 1500 TABLET, EXTENDED RELEASE ORAL at 06:14

## 2021-03-06 RX ADMIN — CARVEDILOL 12.5 MG: 12.5 TABLET, FILM COATED ORAL at 08:19

## 2021-03-06 RX ADMIN — LISINOPRIL 2.5 MG: 5 TABLET ORAL at 08:19

## 2021-03-06 RX ADMIN — POTASSIUM CHLORIDE 40 MEQ: 20 TABLET, EXTENDED RELEASE ORAL at 14:02

## 2021-03-06 RX ADMIN — ENOXAPARIN SODIUM 40 MG: 40 INJECTION SUBCUTANEOUS at 08:19

## 2021-03-06 RX ADMIN — ACETAMINOPHEN 650 MG: 325 TABLET ORAL at 00:34

## 2021-03-06 RX ADMIN — ASPIRIN 81 MG: 81 TABLET, COATED ORAL at 08:19

## 2021-03-06 ASSESSMENT — PAIN SCALES - GENERAL: PAINLEVEL_OUTOF10: 9

## 2021-03-06 NOTE — FLOWSHEET NOTE
Assessment complete. Pt stable through night. 5 Pt potassium 2.8 Dr. Chase Rodriguez via secure message See chart for orders.

## 2021-03-06 NOTE — DISCHARGE SUMMARY
Hospital Medicine Discharge Summary    Leilani Lieberman  :  1975  MRN:  [id number]    Admit date:  3/4/2021  Discharge date:  3/6/2021    Admitting Physician:  Raheem Pederson MD  Primary Care Physician:  No primary care provider on file. Discharge Diagnoses: Active Problems:    Secondary DM with DKA ([address]    Right thyroid nodule  Resolved Problems:    * No resolved hospital problems. *      Hospital Course:   Leilani Lieberman is a 39 y.o. female that was admitted and treated at [facility] for the following medical issues:     DKA  - due to noncompliance  - resolved with IV hydration and IV insulin infusion   - on Lantus, premeal coverage per endocrinology     Hypokalemia, hypomagnesemia, hypophosphatemia   - replaced     Shortness of breath  - CTA of the chest,EKG,troponin,BNP were negative  - resolved  - no further w/u per cardiology     Thyroid nodule  - noted on CT chest, TSH was normal  - thyroid u/s pending  - f/u with endocrinology as outpatient       Disposition - home      Patient was seen by the following consultants while admitted to [facility]:   Consults:  [address] [place] [address]    Significant Diagnostic Studies:    Cta Chest W Wo Contrast    Result Date: 3/4/2021  CT PULMONARY ANGIOGRAM WITH INTRAVENOUS CONTRAST MEDIUM. REASON FOR EXAMINATION:  ELEVATED D-DIMER TECHNIQUE: Helical CTA was performed through the chest utilizing 100 mL of Isovue-300 intravenous contrast.  Images were obtained with bolus tracking in order to opacify the pulmonary arteries. Thick section coronal MIP 3D reconstructions were performed  on a separate workstation. COMPARISON:none FINDINGS:  Pulmonary arteries: No intraluminal filling defects. Thoracic aorta: Normal in course and caliber. Cardiac Size: Normal. Pericardial effusion: None. Right lung: No nodules, masses, pleural effusion, pneumothorax.  Small area of groundglass opacity laterally, right upper lobe Left lung: No nodules, masses, consolidation, pleural effusion, pneumothorax. Lymph notes/mediastinum: No hilar, mediastinal, or axillary lymph node enlargement. 1.4 cm low-attenuation nodule, right lobe thyroid Upper abdomen:Limited imaging upper abdomen shows no gross anomaly. Musculoskeletal:No osteoblastic, and no osteolytic lesions. No CT evidence pulmonary embolism. Small area of groundglass opacity lateral right upper lobe. Finding nonspecific, and may reflect inflammatory or infectious etiology. Right thyroid nodule. Nonemergent thyroid sonography recommended for further assessment to rule out malignancy. All CT scans at this facility use dose modulation, iterative reconstruction, and/or weight based dosing when appropriate to reduce radiation dose to as low as reasonably achievable. Xr Chest Portable    Result Date: 3/4/2021  EXAMINATION: CHEST PORTABLE VIEW  CLINICAL HISTORY: Short of breath COMPARISONS: May 27, 2018  FINDINGS: Single  views of the chest is submitted. The cardiac silhouette is of normal size configuration. Pulmonary vascular unremarkable. Right sided trachea. No focal infiltrates. No Pneumothoraces.                                                                                    NO ACUTE ACTIVE CARDIOPULMONARY PROCESS      Discharge Medications:       Dane Buys   Home Medication Instructions ZBQ:[id number]    Printed on:03/06/21 1303   Medication Information                      aspirin EC 81 MG EC tablet  Take 1 tablet by mouth daily             Blood Glucose Monitoring Suppl (RELION CONFIRM GLUCOSE MONITOR) w/Device KIT  Check glucose 4 times daily             carvedilol (COREG) 12.5 MG tablet  Take 1 tablet by mouth 2 times daily             docusate sodium (COLACE) 100 MG capsule  Take 100 mg by mouth 2 times daily             glucose blood VI test strips (RELION CONFIRM/MICRO TEST) strip  1 each by In Vitro route 4 times daily (before meals and nightly) As needed. insulin glargine (LANTUS) 100 UNIT/ML injection vial  Inject 50 Units into the skin nightly             insulin regular (HUMULIN R) 100 UNIT/ML injection  Inject 18 Units into the skin See Admin Instructions             INSULIN SYRINGE .5CC/29G 29G X 1/2\" 0.5 ML MISC  1 each by Does not apply route 4 times daily (before meals and nightly)             lisinopril (PRINIVIL;ZESTRIL) 2.5 MG tablet  Take 1 tablet by mouth daily             RELION LANCETS MICRO-THIN 33G MISC  1 Device by Does not apply route 4 times daily (before meals and nightly)             traMADol (ULTRAM) 50 MG tablet  Take 50 mg by mouth every 6 hours as needed for Pain. Disposition:   Discharged to Home. Any [facility] needs that were indicated and/or required as been addressed and set up by Social Work. Condition at discharge: Pt was medically stable at the time of discharge. Significant improvement in clinical condition compared to initial condition at presentation to hospital    Activity: activity as tolerated, fall precautions. Total time taken for discharging this patient: 40 minutes. Greater than 70% of time was spent focused exclusively on this patient. Time was taken to review chart, discuss plans with consultants, reconciling medications, discussing plan answering questions with patient. Heather Weiss  3/6/2021, 12:05 PM  ----------------------------------------------------------------------------------------------------------------------    Jacquelyn Mckenna,     Please return to ER or call 911 if you develop any significant signs or symptoms. I may not have addressed all of your medical illnesses or the abnormal blood work or imaging therefore please ask your PCP  and other out patient specialists and providers  to obtain [facility] record entirely to follow up on all of the abnormal labs, imaging and findings that I have and have not addressed during your hospitalization. Discharging you from the hospital does not mean that your medical care ends here and now. You may still need additional work up, investigation, monitoring, and treatment to be handled from this point on by outside providers including your PCP,  , Specialists and other healthcare providers. Please review your list of discharge medications prior to resuming medications you might still have at home, as the medications you need to be taking, dosages or how often you must take them may have changed. For medication questions, contact your retail pharmacy and your PCP,  .     ** I STRONGLY RECOMMEND that you follow up with PCP within 3 to 5 days for a post hospitalization evaluation. This specific office visit is covered by your insurance, and is not the same as your annual doctor visit/ check up. This office visit is important, as it may prevent need for repeat and/or future hospitalizations. **    Your medical team at [facility] ([facility]) appreciates the opportunity to work with you to get well!     Sincerely,  rEwin Roy

## 2021-03-06 NOTE — CONSULTS
[facility] [address]                      [address], [address] [facility]                                  CONSULTATION    PATIENT NAME: Hank Torrez                   :        1975  MED REC NO:   [id number]                            ROOM:       02  ACCOUNT NO:   [de-identified]                           ADMIT DATE: 2021  PROVIDER:     Dana Tinoco MD    CONSULT DATE:  2021    ENDOCRINE CONSULT    REFERRING PROVIDER:  Angelo Dimas MD    REASON FOR CONSULT:  Management of diabetic ketoacidosis, thyroid  nodule. CHIEF COMPLAINT AND HISTORY OF PRESENT ILLNESS:  The patient is a  66-year-old female with known history of inadequately controlled  diabetes, admitted with shortness of breath, constipation for 2 weeks. She has had history of congestive heart failure, hypertension,  presenting with shortness of breath, constipation and also  hyperglycemia. Labs were showing diabetic ketoacidosis. The patient  not been compliant to testing and taking her insulin. Started on IV  fluids, IV insulin drip. Also, had a CAT scan of the chest done. Cardiology was consulted. The patient does see cardiologist on a  regular basis, was seen by Mirian Bean endocrine PA close to two and  half years ago, but has not followed up with her diabetes. Labs done show baseline glucose of 357. Sodium was 128, potassium 3.9,  chloride was 93, CO2 was 9, BUN was 8, creatinine was 0.82, anion gap  was 26. The patient's electrolytes improved, was taken off the insulin  drip and started on Lantus 30 at night, Humalog 5 with each meals. Most  current electrolytes; sodium was 134, potassium 3.2, chloride was 108,  CO2 was 17, BUN 4, creatinine 0.48, phosphorus was low at 1.6. Her A1c  was elevated at 13.7. Prior A1c have been between 6.9 in 2018, to as  high as 15.6 two years ago. CAT scan of the chest done shows a right  thyroid nodule 1.4 cm.   The patient denies otherwise any neck pain. TSH  was normal at 0.567. The patient also wanted to eat solid food, started  on five carb diet. PAST MEDICAL HISTORY:  Significant for type 2 diabetes, postpartum  cardiomyopathy, hypertension, congestive heart failure. PAST SURGICAL HISTORY:  Essentially unremarkable. FAMILY HISTORY:  Diabetes, heart attack. PERSONAL AND SOCIAL HISTORY:  Currently, does smoke cigarettes. Does  use alcohol. Denies any substance abuse. MEDICATIONS:  Here included Lantus 30 at night, Humalog 5 with each  meals, Lovenox, Coreg, Zestril, GlycoLax, Senokot. ALLERGIES:  None. REVIEW OF SYSTEMS:  Other than constipation, shortness of breath,  hyperglycemia, 14-point review of systems was negative. PHYSICAL EXAMINATION:  GENERAL:  The patient is alert, awake, oriented x3 in no obvious  distress, lying in bed. VITAL SIGNS:  Blood pressure was 112/58, pulse rate was 77, respiratory  rate was 16, temperature 98. 3. HEENT:  Normocephalic, atraumatic. Pupils equal, reactive to light. Oral mucosa was moist.  NECK:  Supple. Trachea in midline. CHEST:  Lungs were showing clear auscultation bilaterally. CARDIOVASCULAR:  Heart sounds were normal.  No murmurs or thrills were  present. ABDOMEN:  Soft, moderately obese. Bowel sounds are present. No  organomegaly or tenderness. EXTREMITIES:  Lower extremities reveal no edema. SKIN:  Intact. MUSCULOSKELETAL:  No joint swelling. NEUROLOGIC:  Cranial nerves I through XII was intact. PSYCHIATRIC:  Appears depressed affect. LABORATORY DATA:  As above. ASSESSMENT:  Uncontrolled type 2 diabetes, diabetic ketoacidosis,  congestive heart failure, right thyroid nodule on CAT scan. PLAN:  Change Lantus to 50 at night, Humalog increase dose to 15 with  each meals with high dose Humalog coverage. Get thyroid ultrasound to  check for size of the thyroid nodule. Advance diet if able to eat solid  food. Could be discharged tomorrow.   Monitor electrolytes. Replace  phosphorus. Also reviewed Cardiology consult, no acute cardiac issues  to be addressed at this time. Long-term A1c goal of 7 or lower. Blood  sugar goal eventually 140-200. The patient to follow with Endocrine  after discharge. Thank you for the consult.         Solo Gates MD    D: 03/05/2021 22:13:55       T: 03/05/2021 22:23:38     [email or web address]  Job#: [id number]     Doc#: [id number]    CC:

## 2021-03-06 NOTE — PROGRESS NOTES
Hospitalist Progress Note      PCP: No primary care provider on file.     Date of Admission: 3/4/2021    Chief Complaint:  No acute events, afebrile, stable HD    Medications:  Reviewed    Infusion Medications    dextrose      dextrose 5 % and 0.45 % NaCl Stopped (03/05/21 1728)     Scheduled Medications    aspirin EC  81 mg Oral Daily    carvedilol  12.5 mg Oral BID    lisinopril  2.5 mg Oral Daily    senna  1 tablet Oral Nightly    insulin glargine  50 Units Subcutaneous Nightly    insulin lispro  15 Units Subcutaneous TID WC    insulin lispro  0-18 Units Subcutaneous TID WC    insulin lispro  0-9 Units Subcutaneous Nightly    enoxaparin  40 mg Subcutaneous Daily    polyethylene glycol  17 g Oral Daily     PRN Meds: acetaminophen, glucose, dextrose, glucagon (rDNA), dextrose, dextrose, potassium chloride, magnesium sulfate, sodium phosphate IVPB **OR** sodium phosphate IVPB **OR** sodium phosphate IVPB, dextrose 5 % and 0.45 % NaCl      Intake/Output Summary (Last 24 hours) at 3/6/2021 1156  Last data filed at 3/5/2021 1745  Gross per 24 hour   Intake 2431 ml   Output --   Net 2431 ml       Exam:    /77   Pulse 78   Temp 98.2 °F (36.8 °C) (Oral)   Resp 18   Ht 5' 5\" (1.651 m)   Wt 198 lb 13.7 oz (90.2 kg)   LMP 02/18/2021   SpO2 99%   BMI 33.09 kg/m²     General appearance: alert, appears stated age and cooperative,   Lungs: clear to auscultation bilaterally  Heart: regular rate and rhythm and S1, S2 normal  Abdomen: soft, BS active  Extremities: no edema      Labs:   Recent Labs     03/04/21  1530 03/04/21  1643 03/05/21  0505   WBC 11.2*  --  9.6   HGB 13.9 15.6 12.4   HCT 44.0  --  38.2     --  259     Recent Labs     03/05/21  1721 03/05/21  2101 03/06/21  0458   * 132* 132*   K 3.2* 3.3* 2.8*   * 102 103   CO2 17* 15* 19*   BUN 4* 5* 5*   CREATININE 0.48* 0.73 0.43*   CALCIUM 8.8 9.0 8.6   PHOS 1.6* 1.8* 2.4     Recent Labs     03/04/21  1530   AST 7   ALT 6 BILITOT <0.2   ALKPHOS 140*     No results for input(s): INR in the last 72 hours. Recent Labs     03/04/21  1530 03/05/21  0505   CKTOTAL 28  --    TROPONINI <0.010 <0.010       Urinalysis:      Lab Results   Component Value Date    NITRU Negative 05/28/2018    WBCUA 20-50 05/28/2018    BACTERIA Many 05/28/2018    RBCUA 0-2 05/28/2018    BLOODU LARGE 05/28/2018    SPECGRAV 1.010 05/28/2018    GLUCOSEU 500 05/28/2018       Radiology:  CTA CHEST W WO CONTRAST   Final Result      No CT evidence pulmonary embolism. Small area of groundglass opacity lateral right upper lobe. Finding nonspecific, and may reflect inflammatory or infectious etiology. Right thyroid nodule. Nonemergent thyroid sonography recommended for further assessment to rule out malignancy. All CT scans at this facility use dose modulation, iterative reconstruction, and/or weight based dosing when appropriate to reduce radiation dose to as low as reasonably achievable.          XR CHEST PORTABLE   Final Result   NO ACUTE ACTIVE CARDIOPULMONARY PROCESS      US HEAD NECK SOFT TISSUE THYROID    (Results Pending)           Assessment/Plan:    39 y.o. female with PMH of chronic diastolic HF, IDDM2, HTN, obesity who presented with:     DKA  - resolved with IV hydration and IV insulin infusion   - on Lantus, premeal coverage per endocrinology management    Hypokalemia, hypomagnesemia, hypophosphatemia   - replaced     Shortness of breath  - CTA of the chest,EKG,troponin,BNP were negative  - resolved  - no further w/u per cardiology      HTN  - continue home meds     Constipation  - bowel regimen      Disposition - home today        Electronically signed by Genevieve Garcia MD on 3/6/2021 at 11:56 AM

## 2021-03-08 ENCOUNTER — TELEPHONE (OUTPATIENT)
Dept: ENDOCRINOLOGY | Age: 46
End: 2021-03-08

## 2021-03-08 LAB — C-PEPTIDE: 1.5 NG/ML (ref 1.1–4.4)

## 2021-03-08 NOTE — TELEPHONE ENCOUNTER
Patient can take Novolin NPH   50 units at bedtime [facility] 1 vial is $28 on can be sent to the pharmacy

## 2021-03-08 NOTE — TELEPHONE ENCOUNTER
Patient was discharged from the hospital on Lantus, but is not able to afford this medication. She was wondering if you could prescribe something else that is not as expensive? Please advise. Thanks!

## 2021-03-11 ENCOUNTER — OFFICE VISIT (OUTPATIENT)
Dept: ENDOCRINOLOGY | Age: 46
End: 2021-03-11
Payer: MEDICARE

## 2021-03-11 VITALS
HEIGHT: 65 IN | WEIGHT: 208 LBS | BODY MASS INDEX: 34.66 KG/M2 | HEART RATE: 76 BPM | OXYGEN SATURATION: 98 % | SYSTOLIC BLOOD PRESSURE: 112 MMHG | DIASTOLIC BLOOD PRESSURE: 76 MMHG

## 2021-03-11 DIAGNOSIS — E04.1 LEFT THYROID NODULE: ICD-10-CM

## 2021-03-11 DIAGNOSIS — E11.10 DIABETIC KETOACIDOSIS WITHOUT COMA ASSOCIATED WITH TYPE 2 DIABETES MELLITUS (HCC): ICD-10-CM

## 2021-03-11 DIAGNOSIS — E11.69 TYPE 2 DIABETES MELLITUS WITH OTHER SPECIFIED COMPLICATION, WITH LONG-TERM CURRENT USE OF INSULIN (HCC): ICD-10-CM

## 2021-03-11 DIAGNOSIS — E04.1 RIGHT THYROID NODULE: ICD-10-CM

## 2021-03-11 DIAGNOSIS — Z79.4 TYPE 2 DIABETES MELLITUS WITH OTHER SPECIFIED COMPLICATION, WITH LONG-TERM CURRENT USE OF INSULIN (HCC): ICD-10-CM

## 2021-03-11 DIAGNOSIS — E11.10 DIABETIC KETOACIDOSIS WITHOUT COMA ASSOCIATED WITH TYPE 2 DIABETES MELLITUS (HCC): Primary | ICD-10-CM

## 2021-03-11 LAB
ANION GAP SERPL CALCULATED.3IONS-SCNC: 8 MEQ/L (ref 9–15)
BUN BLDV-MCNC: 10 MG/DL (ref 6–20)
CALCIUM SERPL-MCNC: 9.1 MG/DL (ref 8.5–9.9)
CHLORIDE BLD-SCNC: 104 MEQ/L (ref 95–107)
CO2: 27 MEQ/L (ref 20–31)
CREAT SERPL-MCNC: 0.38 MG/DL (ref 0.5–0.9)
GFR AFRICAN AMERICAN: >60
GFR NON-AFRICAN AMERICAN: >60
GLUCOSE BLD-MCNC: 187 MG/DL (ref 70–99)
GLUTAMIC ACID DECARB AB: <5 IU/ML (ref 0–5)
POTASSIUM SERPL-SCNC: 3.8 MEQ/L (ref 3.4–4.9)
SODIUM BLD-SCNC: 139 MEQ/L (ref 135–144)

## 2021-03-11 PROCEDURE — 99213 OFFICE O/P EST LOW 20 MIN: CPT | Performed by: INTERNAL MEDICINE

## 2021-03-11 NOTE — PROGRESS NOTES
Subjective:      Patient ID: Chloé Gaitan is a 39 y.o. female. Follow-up on type 2 diabetes patient was admitted in the hospital for diabetic ketoacidosis currently taking only regular insulin was prescribed Lantus 50 units at bedtime but unable to take because of cost blood sugars are higher in the morning otherwise stable in the afternoon  Patient on Lantus 50 units at bedtime and Humalog 80 with each meals taking regular insulin 8 units with each meals  Diabetes  She presents for her follow-up diabetic visit. She has type 2 diabetes mellitus. Symptoms are improving. Current diabetic treatment includes insulin injections. She is currently taking insulin pre-lunch, pre-dinner and pre-breakfast. Her overall blood glucose range is >200 mg/dl. (High fasting glucose in the 200s+ postprandial in the 1-200 range)        Review thyroid ultrasound done November to the hospital multiple nodules 2 large nodules would be biopsied right 2.4 cm on the left 1.6 cm    Results for Jannet Mai (MRN [id number]) as of 3/12/2021 22:03   Ref. Range 1/31/2018 06:27 5/27/2018 12:30 5/27/2018 17:09 3/4/2021 15:30 3/5/2021 05:05   Hemoglobin A1C Latest Ref Range: 4.8 - 5.9 % 6.9 (H) 15.6 (H) 15.6 (H) 13.2 (H) 13.7 (H)       Lab Results   Component Value Date    TSH 0.567 03/05/2021         EXAMINATION: US HEAD NECK SOFT TISSUE THYROID       DATE AND TIME:3/6/2021 11:36 AM       CLINICAL HISTORY: Thyromegaly  rt thyroid nodule on ct chest         COMPARISONS: None        TECHNIQUE: Biplanar images were obtained. FINDINGS:                        Right lobe: 5.0 cm.                               Left lobe:  4.7 cm.                            Isthmus:  0.5 cm.                               Overall size: Thyroid gland at the upper limits of normal in size.                           Right nodule: 2.4 x 1.4 cm oval well-circumscribed isoechoic nodule in the inferior pole of the right thyroid lobe.  Several punctate bright echoes without shadowing consistent with microcalcifications. TR 4 nodule. Consider FNA.       8mm mixed solid cystic nodule lower pole right thyroid lobe posterior to the above described isoechoic nodule. Benign-appearing linear echogenic foci. TR 2 nodule.                           Left nodule: #1: 1.6 cm mixed solid cystic nodule lower pole of the left thyroid lobe. Solid component is relatively isoechoic but slightly irregular with some nonspecific echogenic foci. TR 5 nodule. Consider FNA.       #2. 1.2 cm mixed solid cystic nodule that may represent 2 contiguous cysts. Minimal solid component is slightly irregular with punctate echogenic foci. TR 5 nodule. Consider FNA.           Impression   BILATERAL TR 4 AND TR 5 NODULES.                 RECOMMENDATIONS       TR1: No FNA required   TR2: No FNA required   TR3: ? 1.5 cm follow up, ? 2.5 cm FNA             Follow up: 1, 3 and 5 years   TR4: ? 1.0 cm follow up, ? 1.5 cm FNA             Follow up: 1, 2, 3 and 5 years   TR5: ? 0.5 cm follow up, ? 1.0 cm FNA             Annual follow up for up to 5 years       Biopsy is recommended for suspicious lesions (TR 3-TR 5) with the above size criteria. If there are multiple nodules, the 2 nodules with the highest ACR TI-RADS grade's should be sampled (rather than the 2 largest).  Interval enlargement on follow-up is    felt to be significant if there is an increase of 20% in at least 2 nodule dimensions and a minimal increase of 2 mm, or a 50% or greater increase in volume.           Please note that description of thyroid nodules in this report is based on the 2017 Thyroid Imaging, Reporting and Data System (TI- RADS) established by the [employer] of radiology to help provide guidance regarding management of thyroid nodules    based on their appearance.  These are offered in an attempt to  assist  the referring physician in their decision process and help address the current over diagnosis of thyroid cancer.  These guidelines are considered recommendations and guidance and do    not represent rules or absolute standards of care. The ACR TI-RADS system of classifying thyroid nodules has been shown to be accurate and specific in stratifying the risk of thyroid nodules with the goal of minimizing unnecessary biopsies. For purposes    of MIPS compliance it should be noted that the ACR Thyroid Imaging Reporting and Data System (TI-RADS) does not include a TR 0 category to indicate a normal thyroid gland. Therefore TI-RADS does not apply for assessment of a thyroid gland without    nodules.         Patient Active Problem List   Diagnosis    Acute heart failure (HCC)    Trichomonas vaginalis (TV) infection    Essential hypertension    Morbid obesity with BMI of 50.0-59.9, adult ([facility]    Smoker    Acute on chronic diastolic CHF (congestive heart failure) ([facility]    DKA, type 2, not at goal [facility])    Diabetic ketoacidosis without coma associated with type 2 diabetes mellitus ([facility]    [facility]    Right thyroid nodule     No Known Allergies      Current Outpatient Medications:     insulin NPH (NOVOLIN N) 100 UNIT/ML injection vial, 50 units at bedtime, Disp: 1 vial, Rfl: 3    docusate sodium (COLACE) 100 MG capsule, Take 100 mg by mouth 2 times daily, Disp: , Rfl:     traMADol (ULTRAM) 50 MG tablet, Take 50 mg by mouth every 6 hours as needed for Pain., Disp: , Rfl:     aspirin EC 81 MG EC tablet, Take 1 tablet by mouth daily, Disp: 90 tablet, Rfl: 1    carvedilol (COREG) 12.5 MG tablet, Take 1 tablet by mouth 2 times daily, Disp: 60 tablet, Rfl: 3    lisinopril (PRINIVIL;ZESTRIL) 2.5 MG tablet, Take 1 tablet by mouth daily, Disp: 30 tablet, Rfl: 3    Blood Glucose Monitoring Suppl (RELION CONFIRM GLUCOSE MONITOR) w/Device KIT, Check glucose 4 times daily, Disp: 1 kit, Rfl: 0    RELION LANCETS MICRO-THIN 33G MISC, 1 Device by Does not apply route 4 times daily (before meals and nightly), Disp: 150 each, Rfl: 0   glucose blood VI test strips (RELION CONFIRM/MICRO TEST) strip, 1 each by In Vitro route 4 times daily (before meals and nightly) As needed. , Disp: 150 strip, Rfl: 0    insulin regular (HUMULIN R) 100 UNIT/ML injection, Inject 18 Units into the skin See Admin Instructions, Disp: 1 vial, Rfl: 3    INSULIN SYRINGE .5CC/29G 29G X 1/2\" 0.5 ML MISC, 1 each by Does not apply route 4 times daily (before meals and nightly), Disp: 100 each, Rfl: 3    insulin glargine (LANTUS) 100 UNIT/ML injection vial, Inject 50 Units into the skin nightly (Patient not taking: Reported on 3/11/2021), Disp: 1 vial, Rfl: 3        Review of Systems    Vitals:    03/11/21 1434   BP: 112/76   Pulse: 76   SpO2: 98%   Weight: 208 lb (94.3 kg)   Height: 5' 5\" (1.651 m)       Objective:   Physical Exam  Vitals signs reviewed. Constitutional:       Appearance: Normal appearance. She is obese. HENT:      Head: Normocephalic and atraumatic. Nose: Nose normal.   Neck:      Musculoskeletal: Normal range of motion and neck supple. Cardiovascular:      Rate and Rhythm: Normal rate. Musculoskeletal: Normal range of motion. Neurological:      General: No focal deficit present. Mental Status: She is alert and oriented to person, place, and time. Psychiatric:         Mood and Affect: Mood normal.         Assessment:       Diagnosis Orders   1. Diabetic ketoacidosis without coma associated with type 2 diabetes mellitus ([address]  Basic Metabolic Panel   2. Type 2 diabetes mellitus with other specified complication, with long-term current use of insulin ([address]     3. Right thyroid nodule     4.  Left thyroid nodule             Plan:          Orders Placed This Encounter   Procedures    Basic Metabolic Panel     Standing Status:   Future     Number of Occurrences:   1     Standing Expiration Date:   3/11/2022   Rexford Hamman, MD, Interventional Radiology, [facility]     Referral Priority:   Routine     Referral Type:   Eval and Treat Referral Reason:   Specialty Services Required     Requested Specialty:   Radiology     Number of Visits Requested:   1     Continue regular insulin 18 units with each meals prescription given for NPH 50 units at bedtime    Schedule patient for biopsy of the nodules on the right left side    A1c goal of 7 or lower    Orders Placed This Encounter   Medications    insulin NPH (NOVOLIN N) 100 UNIT/ML injection vial     Si units at bedtime     Dispense:  1 vial     Refill:  3                 Misha Foy MD

## 2021-03-16 NOTE — ADT AUTH CERT
Utilization Reviews       additional notes for 3/4/2021 by Renetta Winston RN       Review Status Review Entered   In Primary 3/11/2021 10:59      Criteria Review   Meets mcg   Beta-Hydroxybutyrate 86.4  pH, Peng 7.194  HCO3, Venous 9.6  anion gap 23  lactate 2.07   Glucose 357 then in order POC Glucose 363 POC Glucose 371 POC Glucose 300 POC Glucose 309 POC Glucose 247 Glucose 237 POC Glucose 111 mg/dl On IV Insulin GTT      Diabetes - Clinical Indications for Admission to Inpatient Care by Renetta Winston RN       Review Status Review Entered   Completed 3/11/2021 10:56      Criteria Review      Clinical Indications for Admission to Inpatient Care    Most Recent : Yordy Chavez Most Recent Date: 3/11/2021 10:56 AM EST    (X) Admission is indicated by  1 or more  of the following  (1) (2) (3) (4) (5) (6):       (X) Diabetic ketoacidosis that requires inpatient management as indicated by  ALL  of the following       :          (X) Hyperglycemia (eg, plasma glucose greater than 200 mg/dL (11.10 mmol/L)) [A]          3/5/2021 10:46 AM EST by Giuliana Monaco            [id number]  then in order  POC Glucose 363  POC Glucose 371    POC Glucose 300   POC Glucose 309   POC Glucose 247   Glucose 237   POC Glucose 111 mg/dl   On IV Insulin GTT          3/11/2021 10:56 AM EST by Jessica Sanchez            Beta-Hydroxybutyrate 86.4  pH, Peng 7.194  HCO3, Venous 9.6  anion gap 23  lactate 2.07   Glucose 357 then in order POC Glucose 363 POC Glucose 371 POC Glucose 300 POC Glucose 309 POC Glucose 247 Glucose 237 POC Glucose 111 mg/dl On IV Insulin GTT          (X) Ketonuria or ketonemia (eg, serum beta hydroxybutyrate greater than 3 mmol/L) [B]          3/5/2021 10:46 AM EST by Giuliana Monaco            Beta-Hydroxybutyrate 86.4          (X) Acidosis (eg, arterial or venous pH less than 7.30) [C]          3/5/2021 10:46 AM EST by Giuliana Monaco            pH, Peng 7. 194          (X) Inpatient management appropriate as indicated by  1 or more  of the following :             (X) Arterial or venous pH less than 7.25 [C]             3/5/2021 10:46 AM EST by Akash Maxwell        pH, Peng 7. 194             (X) Serum bicarbonate less than 15 mEq/L (mmol/L)             3/5/2021 10:46 AM EST by Sarah Leung               HCO3, Venous 9.6             (X) Anion gap greater than 12             3/11/2021 10:56 AM EST by Layne Jay

## 2021-05-12 ENCOUNTER — OFFICE VISIT (OUTPATIENT)
Dept: ENDOCRINOLOGY | Age: 46
End: 2021-05-12
Payer: COMMERCIAL

## 2021-05-12 VITALS
OXYGEN SATURATION: 99 % | HEIGHT: 66 IN | DIASTOLIC BLOOD PRESSURE: 67 MMHG | HEART RATE: 69 BPM | SYSTOLIC BLOOD PRESSURE: 110 MMHG | WEIGHT: 218 LBS | BODY MASS INDEX: 35.03 KG/M2

## 2021-05-12 DIAGNOSIS — Z79.4 TYPE 2 DIABETES MELLITUS WITH OTHER SPECIFIED COMPLICATION, WITH LONG-TERM CURRENT USE OF INSULIN (HCC): Primary | ICD-10-CM

## 2021-05-12 DIAGNOSIS — E11.69 TYPE 2 DIABETES MELLITUS WITH OTHER SPECIFIED COMPLICATION, WITH LONG-TERM CURRENT USE OF INSULIN (HCC): Primary | ICD-10-CM

## 2021-05-12 LAB
CHP ED QC CHECK: NORMAL
GLUCOSE BLD-MCNC: 123 MG/DL

## 2021-05-12 PROCEDURE — 99213 OFFICE O/P EST LOW 20 MIN: CPT | Performed by: INTERNAL MEDICINE

## 2021-05-12 PROCEDURE — 82962 GLUCOSE BLOOD TEST: CPT | Performed by: INTERNAL MEDICINE

## 2021-05-12 NOTE — PROGRESS NOTES
insulin injections. She is currently taking insulin pre-breakfast, pre-lunch, pre-dinner and at bedtime. Past Medical History:   Diagnosis Date    CHF (congestive heart failure) ([facility]     Diabetes mellitus [facility]     Hypertension     Postpartum cardiomyopathy      No past surgical history on file. Social History     Socioeconomic History    Marital status: Single     Spouse name: Not on file    Number of children: Not on file    Years of education: Not on file    Highest education level: Not on file   Occupational History    Not on file   Social Needs    Financial resource strain: Not on file    Food insecurity     Worry: Not on file     Inability: Not on file    Transportation needs     Medical: Not on file     Non-medical: Not on file   Tobacco Use    Smoking status: Current Every Day Smoker     Packs/day: 0.20    Smokeless tobacco: Never Used    Tobacco comment: smoked 0.5 PPD until 1 month ago for past 21 years. Currently smoking 2 cigarettes per day   Substance and Sexual Activity    Alcohol use:  Yes     Alcohol/week: 6.0 standard drinks     Types: 6 Cans of beer per week    Drug use: Not Currently     Types: Cocaine    Sexual activity: Not on file   Lifestyle    Physical activity     Days per week: Not on file     Minutes per session: Not on file    Stress: Not on file   Relationships    Social connections     Talks on phone: Not on file     Gets together: Not on file     Attends Religion service: Not on file     Active member of club or organization: Not on file     Attends meetings of clubs or organizations: Not on file     Relationship status: Not on file    Intimate partner violence     Fear of current or ex partner: Not on file     Emotionally abused: Not on file     Physically abused: Not on file     Forced sexual activity: Not on file   Other Topics Concern    Not on file   Social History Narrative    Not on file     Family History   Problem Relation Age of Onset    Diabetes Mother     Heart Attack Father      No Known Allergies    Current Outpatient Medications:     insulin NPH (NOVOLIN N) 100 UNIT/ML injection vial, 50 units at bedtime, Disp: 1 vial, Rfl: 3    docusate sodium (COLACE) 100 MG capsule, Take 100 mg by mouth 2 times daily, Disp: , Rfl:     traMADol (ULTRAM) 50 MG tablet, Take 50 mg by mouth every 6 hours as needed for Pain., Disp: , Rfl:     aspirin EC 81 MG EC tablet, Take 1 tablet by mouth daily, Disp: 90 tablet, Rfl: 1    carvedilol (COREG) 12.5 MG tablet, Take 1 tablet by mouth 2 times daily, Disp: 60 tablet, Rfl: 3    lisinopril (PRINIVIL;ZESTRIL) 2.5 MG tablet, Take 1 tablet by mouth daily, Disp: 30 tablet, Rfl: 3    Blood Glucose Monitoring Suppl (RELION CONFIRM GLUCOSE MONITOR) w/Device KIT, Check glucose 4 times daily, Disp: 1 kit, Rfl: 0    RELION LANCETS MICRO-THIN 33G MISC, 1 Device by Does not apply route 4 times daily (before meals and nightly), Disp: 150 each, Rfl: 0    glucose blood VI test strips (RELION CONFIRM/MICRO TEST) strip, 1 each by In Vitro route 4 times daily (before meals and nightly) As needed. , Disp: 150 strip, Rfl: 0    insulin regular (HUMULIN R) 100 UNIT/ML injection, Inject 18 Units into the skin See Admin Instructions, Disp: 1 vial, Rfl: 3    INSULIN SYRINGE .5CC/29G 29G X 1/2\" 0.5 ML MISC, 1 each by Does not apply route 4 times daily (before meals and nightly), Disp: 100 each, Rfl: 3  Lab Results   Component Value Date     03/11/2021    K 3.8 03/11/2021     03/11/2021    CO2 27 03/11/2021    BUN 10 03/11/2021    CREATININE 0.38 (L) 03/11/2021    GLUCOSE 123 05/12/2021    CALCIUM 9.1 03/11/2021    PROT 7.2 03/04/2021    LABALBU 3.8 03/04/2021    BILITOT <0.2 03/04/2021    ALKPHOS 140 (H) 03/04/2021    AST 7 03/04/2021    ALT 6 03/04/2021    LABGLOM >60.0 03/11/2021    GFRAA >60.0 03/11/2021    GLOB 3.4 03/04/2021     Lab Results   Component Value Date    WBC 9.6 03/05/2021    HGB 12.4 03/05/2021    HCT 38.2 03/05/2021    MCV 87.7 03/05/2021     03/05/2021     Lab Results   Component Value Date    LABA1C 13.7 (H) 03/05/2021    LABA1C 13.2 (H) 03/04/2021    LABA1C 15.6 (H) 05/27/2018     Lab Results   Component Value Date    HDL 32 (L) 01/30/2018    LDLCALC 131 (H) 01/30/2018    CHOL 181 01/30/2018    TRIG 92 01/30/2018       Lab Results   Component Value Date    TSH 0.567 03/05/2021    TSH 1.950 01/29/2018    TSH 2.360 01/29/2018       Review of Systems   Cardiovascular: Negative. Endocrine: Negative. All other systems reviewed and are negative. Objective:   Physical Exam  Vitals reviewed. Constitutional:       General: She is not in acute distress. Appearance: Normal appearance. She is obese. HENT:      Head: Normocephalic and atraumatic. Hair is normal.      Right Ear: External ear normal.      Left Ear: External ear normal.      Nose: Nose normal.   Eyes:      General: No scleral icterus. Right eye: No discharge. Left eye: No discharge. Extraocular Movements: Extraocular movements intact. Conjunctiva/sclera: Conjunctivae normal.   Neck:      Trachea: Trachea normal.   Cardiovascular:      Rate and Rhythm: Normal rate. Pulmonary:      Effort: Pulmonary effort is normal.   Abdominal:      Palpations: Abdomen is soft. Musculoskeletal:         General: Normal range of motion. Cervical back: Normal range of motion and neck supple. Feet:    Neurological:      General: No focal deficit present. Mental Status: She is alert and oriented to person, place, and time.    Psychiatric:         Mood and Affect: Mood normal.         Behavior: Behavior normal.